# Patient Record
Sex: FEMALE | Race: WHITE | NOT HISPANIC OR LATINO | URBAN - METROPOLITAN AREA
[De-identification: names, ages, dates, MRNs, and addresses within clinical notes are randomized per-mention and may not be internally consistent; named-entity substitution may affect disease eponyms.]

---

## 2024-03-18 ENCOUNTER — OFFICE VISIT (OUTPATIENT)
Dept: HEMATOLOGY/ONCOLOGY | Facility: CLINIC | Age: 74
End: 2024-03-18
Payer: MEDICARE

## 2024-03-18 VITALS
HEIGHT: 62 IN | RESPIRATION RATE: 17 BRPM | SYSTOLIC BLOOD PRESSURE: 145 MMHG | DIASTOLIC BLOOD PRESSURE: 82 MMHG | BODY MASS INDEX: 22.14 KG/M2 | WEIGHT: 120.31 LBS | OXYGEN SATURATION: 96 % | HEART RATE: 113 BPM

## 2024-03-18 DIAGNOSIS — C25.0 MALIGNANT NEOPLASM OF HEAD OF PANCREAS: ICD-10-CM

## 2024-03-18 DIAGNOSIS — C25.9 PANCREATIC ADENOCARCINOMA: Primary | ICD-10-CM

## 2024-03-18 PROCEDURE — 1101F PT FALLS ASSESS-DOCD LE1/YR: CPT | Mod: CPTII,S$GLB,, | Performed by: INTERNAL MEDICINE

## 2024-03-18 PROCEDURE — 3008F BODY MASS INDEX DOCD: CPT | Mod: CPTII,S$GLB,, | Performed by: INTERNAL MEDICINE

## 2024-03-18 PROCEDURE — 1125F AMNT PAIN NOTED PAIN PRSNT: CPT | Mod: CPTII,S$GLB,, | Performed by: INTERNAL MEDICINE

## 2024-03-18 PROCEDURE — 99205 OFFICE O/P NEW HI 60 MIN: CPT | Mod: S$GLB,,, | Performed by: INTERNAL MEDICINE

## 2024-03-18 PROCEDURE — 1159F MED LIST DOCD IN RCRD: CPT | Mod: CPTII,S$GLB,, | Performed by: INTERNAL MEDICINE

## 2024-03-18 PROCEDURE — 3079F DIAST BP 80-89 MM HG: CPT | Mod: CPTII,S$GLB,, | Performed by: INTERNAL MEDICINE

## 2024-03-18 PROCEDURE — 3077F SYST BP >= 140 MM HG: CPT | Mod: CPTII,S$GLB,, | Performed by: INTERNAL MEDICINE

## 2024-03-18 PROCEDURE — 3288F FALL RISK ASSESSMENT DOCD: CPT | Mod: CPTII,S$GLB,, | Performed by: INTERNAL MEDICINE

## 2024-03-18 RX ORDER — OXYCODONE AND ACETAMINOPHEN 10; 325 MG/1; MG/1
1 TABLET ORAL EVERY 4 HOURS PRN
COMMUNITY
End: 2024-04-15

## 2024-03-18 NOTE — PROGRESS NOTES
MEDICAL ONCOLOGY INITIAL CONSULTATION NOTE    Patient ID: Luanne Hernández is a 73 y.o. female.    Chief Complaint: Pancreatic adenocarcinoma    HPI: Patient is a 73 year female with no reported PMH who initially presented to [facility] with epigastric abdominal pain ultimately found to have pancreatic mass. MRI  performed and no metastatic lesions found, GI performed EUS. Has been evaluated by Hepato-biliary surgery at  and is consider borderline resectable with plan for dusty-adjuvant chemotherapy. She presents here to establish care close to her house.     Pathology: 3/1/24    Pancreatic head mass, fine needle aspiration (4 smears, 1 ThinPrep, 1   cell block):                                                            - Positive for malignant cells, consistent with ductal adenocarcinoma.         Imaging:     CT C/A/P : 24    Shows an ill-defined low-density mass in the region of the head and uncinate process of the pancreas suspicious for adenocarcinoma. CA 19-9 is over 42,000. There is also diffuse dilation of the pancreatic duct along with pancreatic pseudocyst formation   No evidence of metastatic disease in the chest.           Past Medical History:   Diagnosis Date    Pancreatic cancer      Family History   Problem Relation Age of Onset    Heart failure Mother     Hodgkin's lymphoma Father     Bladder Cancer Brother      Social History     Socioeconomic History    Marital status: Single   Tobacco Use    Smoking status: Former     Current packs/day: 0.00     Types: Cigarettes     Quit date:      Years since quittin.2    Smokeless tobacco: Never   Substance and Sexual Activity    Alcohol use: Not Currently    Drug use: Never     Past Surgical History:   Procedure Laterality Date    LASIK           Review of systems:  Review of Systems   Constitutional:  Positive for activity change. Negative for appetite change, chills, diaphoresis, fatigue and unexpected weight change.   HENT:  Negative for  congestion, facial swelling, hearing loss, mouth sores, trouble swallowing and voice change.    Eyes:  Negative for photophobia, pain, discharge and itching.   Respiratory:  Negative for apnea, cough, choking, chest tightness and shortness of breath.    Cardiovascular:  Negative for chest pain, palpitations and leg swelling.   Gastrointestinal:  Positive for abdominal pain. Negative for abdominal distention, anal bleeding and blood in stool.   Endocrine: Negative for cold intolerance, heat intolerance, polydipsia and polyphagia.   Genitourinary:  Negative for difficulty urinating, dysuria, flank pain and hematuria.   Musculoskeletal:  Negative for arthralgias, back pain, joint swelling, myalgias, neck pain and neck stiffness.   Skin:  Negative for color change, pallor and wound.   Allergic/Immunologic: Negative for environmental allergies, food allergies and immunocompromised state.   Neurological:  Negative for dizziness, seizures, facial asymmetry, speech difficulty, light-headedness, numbness and headaches.   Hematological:  Negative for adenopathy. Does not bruise/bleed easily.   Psychiatric/Behavioral:  Negative for agitation, behavioral problems, confusion, decreased concentration and sleep disturbance.            Physical Exam  Vitals and nursing note reviewed.   Constitutional:       General: She is not in acute distress.     Appearance: Normal appearance. She is not ill-appearing.   HENT:      Head: Normocephalic and atraumatic.      Nose: No congestion or rhinorrhea.   Eyes:      General: No scleral icterus.     Extraocular Movements: Extraocular movements intact.      Pupils: Pupils are equal, round, and reactive to light.   Cardiovascular:      Rate and Rhythm: Normal rate and regular rhythm.      Pulses: Normal pulses.      Heart sounds: Normal heart sounds. No murmur heard.     No gallop.   Pulmonary:      Effort: Pulmonary effort is normal. No respiratory distress.      Breath sounds: Normal breath  sounds. No stridor. No wheezing or rhonchi.   Abdominal:      General: Bowel sounds are normal. There is no distension.      Palpations: There is no mass.      Tenderness: There is abdominal tenderness. There is no guarding.   Musculoskeletal:         General: No swelling, tenderness, deformity or signs of injury. Normal range of motion.      Cervical back: Normal range of motion and neck supple. No rigidity. No muscular tenderness.      Right lower leg: No edema.      Left lower leg: No edema.   Skin:     General: Skin is warm.      Coloration: Skin is not jaundiced or pale.      Findings: No bruising or lesion.   Neurological:      General: No focal deficit present.      Mental Status: She is alert and oriented to person, place, and time.      Cranial Nerves: No cranial nerve deficit.      Sensory: No sensory deficit.      Motor: No weakness.      Gait: Gait normal.   Psychiatric:         Mood and Affect: Mood normal.         Behavior: Behavior normal.         Thought Content: Thought content normal.       Vitals:    03/18/24 0857   BP: (!) 145/82   Pulse: (!) 113   Resp: 17   Body surface area is 1.55 meters squared.    Assessment/Plan:      ECOG 1    Adenocarcinoma arising from pancreatic head and uncinate process:    == Borderline resectable.  No evidence of arterial involvement on CT scan.  No evidence of distant metastatic disease on CT imaging.   Has been evaluated by hepatobiliary surgery in [place] with plan for neoadjuvant chemotherapy.   == Discussed with her and family management guidelines and my recommendation for staging laparoscopy. Baseline  at 42 K at outside hospital  == I discussed with her the intended side effects of treatment giving her opportunity to ask questions.  After detailed discussion of intended side effects we agreed to start gemcitabine Abraxane secondary to her age and performance status.  Prior authorization request for gemcitabine plus Abraxane plus minus subsequent  chemoradiation sent.        2. Pain Management:    == Continue with oxycodone prn      Plan:  Prior authorization for gem Abraxane. Due to her age and convenience will do q 2 weekly  Will also send Referral to Radiation Oncology after few cycles of gem Abraxane  Port placement and diagnostic laparoscopy recommended.  These will be done in [place]          Total time spent in counseling and discussion about further management options including relevant lab work, treatment,  prognosis, medications and intended side effects was more than 60 minutes. More than 50% of the time was spent on counseling and coordination of care.  This includes face to face time and non-face to face time preparing to see the patient (eg, review of tests), Obtaining and/or reviewing separately obtained history, Documenting clinical information in the electronic or other health record, Independently interpreting resultsand communicating results to the patient/family/caregiver, or Care coordination.

## 2024-03-20 ENCOUNTER — TELEPHONE (OUTPATIENT)
Dept: HEMATOLOGY/ONCOLOGY | Facility: CLINIC | Age: 74
End: 2024-03-20
Payer: MEDICARE

## 2024-03-20 NOTE — TELEPHONE ENCOUNTER
LVM for pt to rtc         ----- Message from Lynn Blake sent at 3/20/2024  4:42 PM CDT -----  Contact: self  Type:  Patient Returning Call    Who Called:Luanne Hernández  Who Left Message for Patient:unsure  Does the patient know what this is regarding?:treatment  Would the patient rather a call back or a response via [facility]?   Best Call Back Number:[phone]    Additional Information: [phone] DR. Geovany Diane Oncologist  Laproscopy procedure

## 2024-03-22 ENCOUNTER — TELEPHONE (OUTPATIENT)
Dept: HEMATOLOGY/ONCOLOGY | Facility: CLINIC | Age: 74
End: 2024-03-22

## 2024-03-22 DIAGNOSIS — C25.9 PANCREATIC ADENOCARCINOMA: Primary | ICD-10-CM

## 2024-03-22 LAB
ALBUMIN SERPL BCP-MCNC: 3.3 G/DL (ref 3.4–5)
ALBUMIN/GLOBULIN RATIO: 0.97 RATIO (ref 1.1–1.8)
ALP SERPL-CCNC: 608 U/L (ref 46–116)
ALT SERPL W P-5'-P-CCNC: 464 U/L (ref 12–78)
ANION GAP SERPL CALC-SCNC: 13 MMOL/L (ref 3–11)
AST SERPL-CCNC: 409 U/L (ref 15–37)
BASOPHILS NFR BLD: 0.8 % (ref 0–3)
BILIRUB SERPL-MCNC: 7.5 MG/DL (ref 0–1)
BUN SERPL-MCNC: 9 MG/DL (ref 7–18)
BUN/CREAT SERPL: 11.68 RATIO (ref 7–18)
CALCIUM SERPL-MCNC: 9.8 MG/DL (ref 8.8–10.5)
CHLORIDE SERPL-SCNC: 98 MMOL/L (ref 100–108)
CO2 SERPL-SCNC: 26 MMOL/L (ref 21–32)
CREAT SERPL-MCNC: 0.77 MG/DL (ref 0.55–1.02)
EOSINOPHIL NFR BLD: 2.2 % (ref 1–3)
ERYTHROCYTE [DISTWIDTH] IN BLOOD BY AUTOMATED COUNT: 13.4 % (ref 12.5–18)
GFR ESTIMATION: > 60
GLOBULIN: 3.4 G/DL (ref 2.3–3.5)
GLUCOSE SERPL-MCNC: 70 MG/DL (ref 70–110)
HCT VFR BLD AUTO: 39.6 % (ref 37–47)
HGB BLD-MCNC: 12.5 G/DL (ref 12–16)
LYMPHOCYTES NFR BLD: 15.3 % (ref 25–40)
MCH RBC QN AUTO: 30.8 PG (ref 27–31.2)
MCHC RBC AUTO-ENTMCNC: 31.6 G/DL (ref 31.8–35.4)
MCV RBC AUTO: 97.5 FL (ref 80–97)
MONOCYTES NFR BLD: 8.9 % (ref 1–15)
NEUTROPHILS # BLD AUTO: 5.59 10*3/UL (ref 1.8–7.7)
NEUTROPHILS NFR BLD: 72.4 % (ref 37–80)
NUCLEATED RED BLOOD CELLS: 0 %
PLATELETS: 280 10*3/UL (ref 142–424)
POTASSIUM SERPL-SCNC: 3.9 MMOL/L (ref 3.6–5.2)
PROT SERPL-MCNC: 6.7 G/DL (ref 6.4–8.2)
RBC # BLD AUTO: 4.06 10*6/UL (ref 4.2–5.4)
SODIUM BLD-SCNC: 137 MMOL/L (ref 135–145)
WBC # BLD: 7.7 10*3/UL (ref 4.6–10.2)

## 2024-03-22 NOTE — TELEPHONE ENCOUNTER
Called the patient to discuss date for port in [place]. Patient states she does not have an appointment yet that Dr Jacob Diane Oncologist  would like to discuss care with Dr Mazariegos. Secure Message sent to provider with information. GABRIELLA

## 2024-03-24 LAB — CANCER AG19-9 SERPL-ACNC: 9869 U/ML

## 2024-03-25 ENCOUNTER — CLINICAL SUPPORT (OUTPATIENT)
Dept: HEMATOLOGY/ONCOLOGY | Facility: CLINIC | Age: 74
End: 2024-03-25
Payer: MEDICARE

## 2024-03-25 VITALS
HEIGHT: 62 IN | BODY MASS INDEX: 22.12 KG/M2 | OXYGEN SATURATION: 95 % | SYSTOLIC BLOOD PRESSURE: 130 MMHG | DIASTOLIC BLOOD PRESSURE: 81 MMHG | HEART RATE: 84 BPM | RESPIRATION RATE: 18 BRPM | WEIGHT: 120.19 LBS

## 2024-03-25 DIAGNOSIS — G89.3 CANCER RELATED PAIN: ICD-10-CM

## 2024-03-25 DIAGNOSIS — R17 ELEVATED BILIRUBIN: ICD-10-CM

## 2024-03-25 DIAGNOSIS — C25.9 PANCREATIC ADENOCARCINOMA: Primary | ICD-10-CM

## 2024-03-25 PROCEDURE — 99417 PROLNG OP E/M EACH 15 MIN: CPT | Mod: S$GLB,,, | Performed by: NURSE PRACTITIONER

## 2024-03-25 PROCEDURE — 99215 OFFICE O/P EST HI 40 MIN: CPT | Mod: S$GLB,,, | Performed by: NURSE PRACTITIONER

## 2024-03-25 PROCEDURE — G2211 COMPLEX E/M VISIT ADD ON: HCPCS | Mod: S$GLB,,, | Performed by: NURSE PRACTITIONER

## 2024-03-25 RX ORDER — OXYCODONE HYDROCHLORIDE 10 MG/1
10 TABLET ORAL EVERY 8 HOURS PRN
Qty: 60 TABLET | Refills: 0 | Status: SHIPPED | OUTPATIENT
Start: 2024-03-25 | End: 2024-04-01

## 2024-03-25 NOTE — PROGRESS NOTES
MEDICAL ONCOLOGY INITIAL CONSULTATION NOTE    Patient ID: Luanne Hernández is a 73 y.o. female.    Chief Complaint: Pancreatic adenocarcinoma    HPI: Patient is a 73 year female with no reported PMH who initially presented to [facility] with epigastric abdominal pain ultimately found to have pancreatic mass. MRI  performed and no metastatic lesions found, GI performed EUS. Has been evaluated by Hepato-biliary surgery at  and is consider borderline resectable with plan for dusty-adjuvant chemotherapy. She presents here to establish care close to her house.     Pathology: 3/1/24    Pancreatic head mass, fine needle aspiration (4 smears, 1 ThinPrep, 1   cell block):                                                            - Positive for malignant cells, consistent with ductal adenocarcinoma.         Imaging:     CT C/A/P : 24    Shows an ill-defined low-density mass in the region of the head and uncinate process of the pancreas suspicious for adenocarcinoma. CA 19-9 is over 42,000. There is also diffuse dilation of the pancreatic duct along with pancreatic pseudocyst formation   No evidence of metastatic disease in the chest.           Past Medical History:   Diagnosis Date    Pancreatic cancer      Family History   Problem Relation Age of Onset    Heart failure Mother     Hodgkin's lymphoma Father     Bladder Cancer Brother      Social History     Socioeconomic History    Marital status: Single   Tobacco Use    Smoking status: Former     Current packs/day: 0.00     Types: Cigarettes     Quit date:      Years since quittin.2    Smokeless tobacco: Never   Substance and Sexual Activity    Alcohol use: Not Currently    Drug use: Never     Past Surgical History:   Procedure Laterality Date    LASIK           Review of systems:  Review of Systems   Constitutional:  Positive for activity change. Negative for appetite change, chills, diaphoresis, fatigue and unexpected weight change.   HENT:  Negative for  congestion, facial swelling, hearing loss, mouth sores, trouble swallowing and voice change.    Eyes:  Negative for photophobia, pain, discharge and itching.   Respiratory:  Negative for apnea, cough, choking, chest tightness and shortness of breath.    Cardiovascular:  Negative for chest pain, palpitations and leg swelling.   Gastrointestinal:  Positive for abdominal pain. Negative for abdominal distention, anal bleeding and blood in stool.   Endocrine: Negative for cold intolerance, heat intolerance, polydipsia and polyphagia.   Genitourinary:  Negative for difficulty urinating, dysuria, flank pain and hematuria.   Musculoskeletal:  Negative for arthralgias, back pain, joint swelling, myalgias, neck pain and neck stiffness.   Skin:  Negative for color change, pallor and wound.   Allergic/Immunologic: Negative for environmental allergies, food allergies and immunocompromised state.   Neurological:  Negative for dizziness, seizures, facial asymmetry, speech difficulty, light-headedness, numbness and headaches.   Hematological:  Negative for adenopathy. Does not bruise/bleed easily.   Psychiatric/Behavioral:  Negative for agitation, behavioral problems, confusion, decreased concentration and sleep disturbance.            Physical Exam  Vitals and nursing note reviewed.   Constitutional:       General: She is not in acute distress.     Appearance: Normal appearance. She is not ill-appearing.   HENT:      Head: Normocephalic and atraumatic.      Nose: No congestion or rhinorrhea.   Eyes:      General: No scleral icterus.     Extraocular Movements: Extraocular movements intact.      Pupils: Pupils are equal, round, and reactive to light.   Cardiovascular:      Rate and Rhythm: Normal rate and regular rhythm.      Pulses: Normal pulses.      Heart sounds: Normal heart sounds. No murmur heard.     No gallop.   Pulmonary:      Effort: Pulmonary effort is normal. No respiratory distress.      Breath sounds: Normal breath  sounds. No stridor. No wheezing or rhonchi.   Abdominal:      General: Bowel sounds are normal. There is no distension.      Palpations: There is no mass.      Tenderness: There is abdominal tenderness. There is no guarding.   Musculoskeletal:         General: No swelling, tenderness, deformity or signs of injury. Normal range of motion.      Cervical back: Normal range of motion and neck supple. No rigidity. No muscular tenderness.      Right lower leg: No edema.      Left lower leg: No edema.   Skin:     General: Skin is warm.      Coloration: Skin is not jaundiced or pale.      Findings: No bruising or lesion.   Neurological:      General: No focal deficit present.      Mental Status: She is alert and oriented to person, place, and time.      Cranial Nerves: No cranial nerve deficit.      Sensory: No sensory deficit.      Motor: No weakness.      Gait: Gait normal.   Psychiatric:         Mood and Affect: Mood normal.         Behavior: Behavior normal.         Thought Content: Thought content normal.     There were no vitals filed for this visit.  There is no height or weight on file to calculate BSA.    Assessment/Plan:      ECOG 1    Adenocarcinoma arising from pancreatic head and uncinate process:    == Borderline resectable.  No evidence of arterial involvement on CT scan.  No evidence of distant metastatic disease on CT imaging.   Has been evaluated by hepatobiliary surgery in [place] with plan for neoadjuvant chemotherapy.   == Discussed with her and family management guidelines and my recommendation for staging laparoscopy. Baseline  at 42 K at outside hospital  == I discussed with her the intended side effects of treatment giving her opportunity to ask questions.  After detailed discussion of intended side effects we agreed to start gemcitabine Abraxane secondary to her age and performance status.  Prior authorization request for gemcitabine plus Abraxane plus minus subsequent chemoradiation  sent.    ==03/25/2024: Patient to clinic for chemotherapy education on Gemcitabine/Abraxane. She has some right flank pain and jaundice. Family with her reports donell started yesterday.  Labs drawn 3.22/24 reviewed and bili 7.5, labs drawn 3 weeks prior bili was 0.4. Chemotherapy education not provided, pt needs evaluation may need stent placement.  Local ER not able to do MRCP/bili stents, called Dr. Gilmore's office in [place] and spoke to his nurse who reported he will not be back until 3/28/2024 with no coverage until then, called [facility] where pt was initially diagnosed, no answer, left messages.  discussed with patient and Dr. Mazariegos, will send referral to [facility] and patient will go to ER.  Pt will still need port placement for chemotherapy, lap for staging, at this time most acute matter is liver function.    2. Pain Management:  == Continue with oxycodone prn  ==Refilled 3/25/2024  ==Avoid acetaminophen     Plan:  To ER - Bilirubin 7.5,   Refer to [facility]  Will follow up with patient after hospital and rtc for chemotherapy education  Port placement and diagnostic laparoscopy recommended.           Total time spent in counseling and discussion about further management options including relevant lab work, treatment,  prognosis, medications and intended side effects was more than 60 minutes. More than 50% of the time was spent on counseling and coordination of care.  This includes face to face time and non-face to face time preparing to see the patient (eg, review of tests), Obtaining and/or reviewing separately obtained history, Documenting clinical information in the electronic or other health record, Independently interpreting resultsand communicating results to the patient/family/caregiver, or Care coordination.

## 2024-03-27 ENCOUNTER — TELEPHONE (OUTPATIENT)
Dept: HEMATOLOGY/ONCOLOGY | Facility: CLINIC | Age: 74
End: 2024-03-27
Payer: MEDICARE

## 2024-03-27 DIAGNOSIS — C25.9 PANCREATIC ADENOCARCINOMA: Primary | ICD-10-CM

## 2024-03-27 NOTE — TELEPHONE ENCOUNTER
Spoke to patient, she has been d/c'd from [facility] post bilis stent placement. She needs \[facility] [place] [facility]\"" to start chemotherapy.  Will send referral for port placement.

## 2024-03-28 ENCOUNTER — TELEPHONE (OUTPATIENT)
Dept: HEMATOLOGY/ONCOLOGY | Facility: CLINIC | Age: 74
End: 2024-03-28
Payer: MEDICARE

## 2024-03-28 NOTE — TELEPHONE ENCOUNTER
----- Message from Boubacar Leo sent at 3/28/2024  8:42 AM CDT -----  Contact: self  Patient Luanne Hernández is requesting a call back regarding MediPort placement and confirmation. Previous call was dropped. Please call back at [phone]

## 2024-03-28 NOTE — TELEPHONE ENCOUNTER
Spoke with the patient Port IV appointment given. New chemo ed appt given. All questions answered. Spoke with Karen PURCELL regarding stent placements. She was aware. Patient states she will need to be back in [place] on 4/16/24 to remove pancreatic stent. Karen made aware. JESEN

## 2024-04-01 ENCOUNTER — OFFICE VISIT (OUTPATIENT)
Dept: SURGERY | Facility: CLINIC | Age: 74
End: 2024-04-01
Payer: MEDICARE

## 2024-04-01 ENCOUNTER — OFFICE VISIT (OUTPATIENT)
Dept: HEMATOLOGY/ONCOLOGY | Facility: CLINIC | Age: 74
End: 2024-04-01
Payer: MEDICARE

## 2024-04-01 VITALS
DIASTOLIC BLOOD PRESSURE: 74 MMHG | RESPIRATION RATE: 16 BRPM | HEIGHT: 62 IN | HEART RATE: 103 BPM | BODY MASS INDEX: 21.97 KG/M2 | OXYGEN SATURATION: 95 % | WEIGHT: 119.38 LBS | SYSTOLIC BLOOD PRESSURE: 108 MMHG

## 2024-04-01 DIAGNOSIS — G89.3 CANCER RELATED PAIN: ICD-10-CM

## 2024-04-01 DIAGNOSIS — C25.9 PANCREATIC ADENOCARCINOMA: Primary | ICD-10-CM

## 2024-04-01 DIAGNOSIS — N28.89 RENAL MASS: ICD-10-CM

## 2024-04-01 LAB
ALBUMIN SERPL BCP-MCNC: 2.9 G/DL (ref 3.4–5)
ALBUMIN/GLOBULIN RATIO: 0.83 RATIO (ref 1.1–1.8)
ALP SERPL-CCNC: 311 U/L (ref 46–116)
ALT SERPL W P-5'-P-CCNC: 123 U/L (ref 12–78)
ANION GAP SERPL CALC-SCNC: 10 MMOL/L (ref 3–11)
AST SERPL-CCNC: 36 U/L (ref 15–37)
BASOPHILS NFR BLD: 0.5 % (ref 0–3)
BILIRUB SERPL-MCNC: 2.2 MG/DL (ref 0–1)
BILIRUBIN DIRECT+TOT PNL SERPL-MCNC: 1.7 MG/DL (ref 0–0.3)
BUN SERPL-MCNC: 12 MG/DL (ref 7–18)
BUN/CREAT SERPL: 12.12 RATIO (ref 7–18)
CALCIUM SERPL-MCNC: 9 MG/DL (ref 8.8–10.5)
CHLORIDE SERPL-SCNC: 99 MMOL/L (ref 100–108)
CO2 SERPL-SCNC: 28 MMOL/L (ref 21–32)
CREAT SERPL-MCNC: 0.99 MG/DL (ref 0.55–1.02)
EOSINOPHIL NFR BLD: 1.4 % (ref 1–3)
ERYTHROCYTE [DISTWIDTH] IN BLOOD BY AUTOMATED COUNT: 14.2 % (ref 12.5–18)
GFR ESTIMATION: 55
GLOBULIN: 3.5 G/DL (ref 2.3–3.5)
GLUCOSE SERPL-MCNC: 97 MG/DL (ref 70–110)
HCT VFR BLD AUTO: 34.3 % (ref 37–47)
HGB BLD-MCNC: 10.7 G/DL (ref 12–16)
LYMPHOCYTES NFR BLD: 19.6 % (ref 25–40)
MCH RBC QN AUTO: 30.7 PG (ref 27–31.2)
MCHC RBC AUTO-ENTMCNC: 31.2 G/DL (ref 31.8–35.4)
MCV RBC AUTO: 98.6 FL (ref 80–97)
MONOCYTES NFR BLD: 13.9 % (ref 1–15)
NEUTROPHILS # BLD AUTO: 5.23 10*3/UL (ref 1.8–7.7)
NEUTROPHILS NFR BLD: 64.2 % (ref 37–80)
NUCLEATED RED BLOOD CELLS: 0 %
PLATELETS: 293 10*3/UL (ref 142–424)
POTASSIUM SERPL-SCNC: 4.1 MMOL/L (ref 3.6–5.2)
PROT SERPL-MCNC: 6.4 G/DL (ref 6.4–8.2)
RBC # BLD AUTO: 3.48 10*6/UL (ref 4.2–5.4)
SODIUM BLD-SCNC: 137 MMOL/L (ref 135–145)
WBC # BLD: 8.1 10*3/UL (ref 4.6–10.2)

## 2024-04-01 PROCEDURE — 1126F AMNT PAIN NOTED NONE PRSNT: CPT | Mod: CPTII,S$GLB,, | Performed by: NURSE PRACTITIONER

## 2024-04-01 PROCEDURE — 1101F PT FALLS ASSESS-DOCD LE1/YR: CPT | Mod: CPTII,S$GLB,, | Performed by: NURSE PRACTITIONER

## 2024-04-01 PROCEDURE — 1159F MED LIST DOCD IN RCRD: CPT | Mod: CPTII,S$GLB,, | Performed by: NURSE PRACTITIONER

## 2024-04-01 PROCEDURE — 99215 OFFICE O/P EST HI 40 MIN: CPT | Mod: S$GLB,,, | Performed by: NURSE PRACTITIONER

## 2024-04-01 PROCEDURE — 3074F SYST BP LT 130 MM HG: CPT | Mod: CPTII,S$GLB,, | Performed by: NURSE PRACTITIONER

## 2024-04-01 PROCEDURE — 3078F DIAST BP <80 MM HG: CPT | Mod: CPTII,S$GLB,, | Performed by: NURSE PRACTITIONER

## 2024-04-01 PROCEDURE — 3008F BODY MASS INDEX DOCD: CPT | Mod: CPTII,S$GLB,, | Performed by: NURSE PRACTITIONER

## 2024-04-01 PROCEDURE — G2211 COMPLEX E/M VISIT ADD ON: HCPCS | Mod: S$GLB,,, | Performed by: NURSE PRACTITIONER

## 2024-04-01 PROCEDURE — 1159F MED LIST DOCD IN RCRD: CPT | Mod: CPTII,S$GLB,, | Performed by: SURGERY

## 2024-04-01 PROCEDURE — 99203 OFFICE O/P NEW LOW 30 MIN: CPT | Mod: S$GLB,,, | Performed by: SURGERY

## 2024-04-01 PROCEDURE — 1160F RVW MEDS BY RX/DR IN RCRD: CPT | Mod: CPTII,S$GLB,, | Performed by: SURGERY

## 2024-04-01 PROCEDURE — 3288F FALL RISK ASSESSMENT DOCD: CPT | Mod: CPTII,S$GLB,, | Performed by: NURSE PRACTITIONER

## 2024-04-01 RX ORDER — AMOXICILLIN 250 MG
1 CAPSULE ORAL 2 TIMES DAILY
COMMUNITY
Start: 2024-03-27 | End: 2024-06-25

## 2024-04-01 RX ORDER — ONDANSETRON HYDROCHLORIDE 8 MG/1
8 TABLET, FILM COATED ORAL EVERY 12 HOURS PRN
Qty: 30 TABLET | Refills: 2 | Status: SHIPPED | OUTPATIENT
Start: 2024-04-01 | End: 2025-04-01

## 2024-04-01 RX ORDER — PANTOPRAZOLE SODIUM 40 MG/1
40 TABLET, DELAYED RELEASE ORAL DAILY
COMMUNITY
Start: 2024-03-27 | End: 2024-05-16 | Stop reason: SDUPTHER

## 2024-04-01 RX ORDER — POLYETHYLENE GLYCOL 3350 17 G/17G
17 POWDER, FOR SOLUTION ORAL DAILY
COMMUNITY
Start: 2024-03-28 | End: 2024-06-26

## 2024-04-01 RX ORDER — PANTOPRAZOLE SODIUM 20 MG/1
20 TABLET, DELAYED RELEASE ORAL DAILY
COMMUNITY
End: 2024-04-01

## 2024-04-01 RX ORDER — OXYCODONE HYDROCHLORIDE 10 MG/1
10 TABLET ORAL EVERY 8 HOURS PRN
COMMUNITY
End: 2024-04-01

## 2024-04-01 NOTE — PROGRESS NOTES
History & Physical    SUBJECTIVE:     History of Present Illness:    73-year-old female recently diagnosed with pancreatic cancer and is referred to me for venous access port placement to start chemotherapy on Wednesday.  Recently status post both pancreatic and biliary stent placement at [facility]    Chief Complaint   Patient presents with    Other     port         Review of patient's allergies indicates:  Review of patient's allergies indicates:  Not on File    Current Outpatient Medications on File Prior to Visit   Medication Sig Dispense Refill    oxyCODONE-acetaminophen (PERCOCET)  mg per tablet Take 1 tablet by mouth every 4 (four) hours as needed for Pain.      [DISCONTINUED] oxyCODONE (ROXICODONE) 10 mg Tab immediate release tablet Take 1 tablet (10 mg total) by mouth every 8 (eight) hours as needed for Pain. 60 tablet 0     No current facility-administered medications on file prior to visit.       Past Medical History:   Diagnosis Date    Pancreatic cancer      Past Surgical History:   Procedure Laterality Date    LASIK Bilateral      Family History   Problem Relation Age of Onset    Heart failure Mother     Hodgkin's lymphoma Father     Bladder Cancer Brother        Social History     Socioeconomic History    Marital status: Single   Tobacco Use    Smoking status: Former     Current packs/day: 0.00     Types: Cigarettes     Quit date:      Years since quittin.2    Smokeless tobacco: Former   Substance and Sexual Activity    Alcohol use: Not Currently    Drug use: Never          Review of Systems   Constitutional:  Positive for malaise/fatigue and weight loss.   Respiratory: Negative.     Cardiovascular: Negative.    Gastrointestinal:  Positive for abdominal pain.   Musculoskeletal:  Positive for back pain.   Neurological: Negative.    Endo/Heme/Allergies: Negative.    Psychiatric/Behavioral: Negative.         OBJECTIVE:     There were no vitals filed for this visit.              Physical  Exam:  Physical Exam  Constitutional:       Appearance: Normal appearance.   HENT:      Head: Normocephalic.   Eyes:      Pupils: Pupils are equal, round, and reactive to light.   Cardiovascular:      Rate and Rhythm: Normal rate and regular rhythm.   Pulmonary:      Effort: Pulmonary effort is normal.      Breath sounds: Normal breath sounds.   Abdominal:      General: Abdomen is flat. Bowel sounds are normal.      Palpations: Abdomen is soft.   Skin:     General: Skin is warm and dry.   Neurological:      General: No focal deficit present.      Mental Status: She is alert and oriented to person, place, and time.      Cranial Nerves: No cranial nerve deficit.   Psychiatric:         Mood and Affect: Mood normal.         Behavior: Behavior normal.             ASSESSMENT/PLAN:   Had a pancreas adenocarcinoma, plans to start chemotherapy and needs venous access port  PLAN:  Discussed with and schedule patient for tomorrow a left subclavian venous access PowerPort placement.  Procedure, risk and benefits discussed and all questions answered

## 2024-04-01 NOTE — PROGRESS NOTES
MEDICAL ONCOLOGY INITIAL CONSULTATION NOTE    Patient ID: Luanne Hernández is a 73 y.o. female.    Chief Complaint: Pancreatic adenocarcinoma    HPI: Patient is a 73 year female with no reported PMH who initially presented to [facility] with epigastric abdominal pain ultimately found to have pancreatic mass. MRI  performed and no metastatic lesions found, GI performed EUS. Has been evaluated by Hepato-biliary surgery at  and is consider borderline resectable with plan for dusty-adjuvant chemotherapy. She presents here to establish care close to her house.     Pathology: 3/1/24    Pancreatic head mass, fine needle aspiration (4 smears, 1 ThinPrep, 1   cell block):                                                            - Positive for malignant cells, consistent with ductal adenocarcinoma.         Imaging:     CT C/A/P : 24    Shows an ill-defined low-density mass in the region of the head and uncinate process of the pancreas suspicious for adenocarcinoma. CA 19-9 is over 42,000. There is also diffuse dilation of the pancreatic duct along with pancreatic pseudocyst formation   No evidence of metastatic disease in the chest.           Past Medical History:   Diagnosis Date    Pancreatic cancer      Family History   Problem Relation Age of Onset    Heart failure Mother     Hodgkin's lymphoma Father     Bladder Cancer Brother      Social History     Socioeconomic History    Marital status: Single   Tobacco Use    Smoking status: Former     Current packs/day: 0.00     Types: Cigarettes     Quit date:      Years since quittin.2    Smokeless tobacco: Never   Substance and Sexual Activity    Alcohol use: Not Currently    Drug use: Never     Past Surgical History:   Procedure Laterality Date    LASIK           Review of systems:  Review of Systems   Constitutional:  Positive for activity change. Negative for appetite change, chills, diaphoresis, fatigue and unexpected weight change.   HENT:  Negative for  congestion, facial swelling, hearing loss, mouth sores, trouble swallowing and voice change.    Eyes:  Negative for photophobia, pain, discharge and itching.   Respiratory:  Negative for apnea, cough, choking, chest tightness and shortness of breath.    Cardiovascular:  Negative for chest pain, palpitations and leg swelling.   Gastrointestinal:  Positive for abdominal pain. Negative for abdominal distention, anal bleeding and blood in stool.   Endocrine: Negative for cold intolerance, heat intolerance, polydipsia and polyphagia.   Genitourinary:  Negative for difficulty urinating, dysuria, flank pain and hematuria.   Musculoskeletal:  Negative for arthralgias, back pain, joint swelling, myalgias, neck pain and neck stiffness.   Skin:  Negative for color change, pallor and wound.   Allergic/Immunologic: Negative for environmental allergies, food allergies and immunocompromised state.   Neurological:  Negative for dizziness, seizures, facial asymmetry, speech difficulty, light-headedness, numbness and headaches.   Hematological:  Negative for adenopathy. Does not bruise/bleed easily.   Psychiatric/Behavioral:  Negative for agitation, behavioral problems, confusion, decreased concentration and sleep disturbance.            Physical Exam  Vitals and nursing note reviewed.   Constitutional:       General: She is not in acute distress.     Appearance: Normal appearance. She is not ill-appearing.   HENT:      Head: Normocephalic and atraumatic.      Nose: No congestion or rhinorrhea.   Eyes:      General: No scleral icterus.     Extraocular Movements: Extraocular movements intact.      Pupils: Pupils are equal, round, and reactive to light.   Cardiovascular:      Rate and Rhythm: Normal rate and regular rhythm.      Pulses: Normal pulses.      Heart sounds: Normal heart sounds. No murmur heard.     No gallop.   Pulmonary:      Effort: Pulmonary effort is normal. No respiratory distress.      Breath sounds: Normal breath  sounds. No stridor. No wheezing or rhonchi.   Abdominal:      General: Bowel sounds are normal. There is no distension.      Palpations: There is no mass.      Tenderness: There is abdominal tenderness. There is no guarding.   Musculoskeletal:         General: No swelling, tenderness, deformity or signs of injury. Normal range of motion.      Cervical back: Normal range of motion and neck supple. No rigidity. No muscular tenderness.      Right lower leg: No edema.      Left lower leg: No edema.   Skin:     General: Skin is warm.      Coloration: Skin is not jaundiced or pale.      Findings: No bruising or lesion.   Neurological:      General: No focal deficit present.      Mental Status: She is alert and oriented to person, place, and time.      Cranial Nerves: No cranial nerve deficit.      Sensory: No sensory deficit.      Motor: No weakness.      Gait: Gait normal.   Psychiatric:         Mood and Affect: Mood normal.         Behavior: Behavior normal.         Thought Content: Thought content normal.     There were no vitals filed for this visit.  There is no height or weight on file to calculate BSA.    Assessment/Plan:      ECOG 1    Adenocarcinoma arising from pancreatic head and uncinate process:    == Borderline resectable.  No evidence of arterial involvement on CT scan.  No evidence of distant metastatic disease on CT imaging.   Has been evaluated by hepatobiliary surgery in [place] with plan for neoadjuvant chemotherapy.   == Discussed with her and family management guidelines and my recommendation for staging laparoscopy. Baseline  at 42 K at outside hospital  == I discussed with her the intended side effects of treatment giving her opportunity to ask questions.  After detailed discussion of intended side effects we agreed to start gemcitabine Abraxane secondary to her age and performance status.  Prior authorization request for gemcitabine plus Abraxane plus minus subsequent chemoradiation  sent.    ==03/25/2024: Patient to clinic for chemotherapy education on Gemcitabine/Abraxane. She has some right flank pain and jaundice. Family with her reports juandice started yesterday.  Labs drawn 3.22/24 reviewed and bili 7.5, labs drawn 3 weeks prior bili was 0.4. Chemotherapy education not provided, pt needs evaluation may need stent placement.  Local ER not able to do MRCP/bili stents, called Dr. Gilmore's office in [place] and spoke to his nurse who reported he will not be back until 3/28/2024 with no coverage until then, called [facility] pt was initially diagnosed, no answer, left messages.  discussed with patient and Dr. Mazariegos, will send referral to [facility] and patient will go to ER.  Pt will still need port placement for chemotherapy, lap for staging, at this time most acute matter is liver function.  ==04/01/2024:  Pt here today to discuss upcoming chemotherapy, side effect profile, risk versus benefits, and expected outcomes have been discussed today. All questions and concerns answered and consents have been signed. Pt taking oxycodone 10mg po q 4 hours per [facility].  Plan to start Gemcitabine/Abraxane as soon as port placed, pt has appt with Dr. Mcqueen today for eval for port placement.  Pt was seen at [facility] on 3/25/2024 and biliary stents placed, she has appt on 4/16/24 at [facility] for removal of plastic stents, metal to remain in place. Discussed with Dr. Mazariegos, plan to start chemo asap once port placed, will defer diagnostic lap initially recommended for staging    2. Pain Management:  == Continue with oxycodone prn  ==Refilled 3/25/2024  ==Avoid acetaminophen     3. Renal Mass  ==Per ct at [facility]  ==Possible renal calculi per CT at [place] vs possible second primary, can work up once patient's pancreatic cancer is stable, current priority is to start chemotherapy as she was recently admitted for obstructive juandice secondary to her pancreatic cancer    4. Advanced Care Planning  ==Pt  has power of , will bring in at next visit    Plan:  Chemotherapy asap once port placed  Cbc cmp prior          Total time spent in counseling and discussion about further management options including relevant lab work, treatment,  prognosis, medications and intended side effects was more than 60 minutes. More than 50% of the time was spent on counseling and coordination of care.  This includes face to face time and non-face to face time preparing to see the patient (eg, review of tests), Obtaining and/or reviewing separately obtained history, Documenting clinical information in the electronic or other health record, Independently interpreting resultsand communicating results to the patient/family/caregiver, or Care coordination.

## 2024-04-02 ENCOUNTER — OUTSIDE PLACE OF SERVICE (OUTPATIENT)
Dept: SURGERY | Facility: CLINIC | Age: 74
End: 2024-04-02
Payer: MEDICARE

## 2024-04-02 ENCOUNTER — TELEPHONE (OUTPATIENT)
Dept: HEMATOLOGY/ONCOLOGY | Facility: CLINIC | Age: 74
End: 2024-04-02
Payer: MEDICARE

## 2024-04-02 DIAGNOSIS — G89.3 CANCER RELATED PAIN: Primary | ICD-10-CM

## 2024-04-02 DIAGNOSIS — C25.0 MALIGNANT NEOPLASM OF HEAD OF PANCREAS: Primary | ICD-10-CM

## 2024-04-02 PROCEDURE — 77001 FLUOROGUIDE FOR VEIN DEVICE: CPT | Mod: 26,,, | Performed by: SURGERY

## 2024-04-02 PROCEDURE — 36561 INSERT TUNNELED CV CATH: CPT | Mod: LT,,, | Performed by: SURGERY

## 2024-04-02 RX ORDER — ONDANSETRON HYDROCHLORIDE 2 MG/ML
8 INJECTION, SOLUTION INTRAVENOUS
Status: CANCELLED | OUTPATIENT
Start: 2024-04-04

## 2024-04-02 RX ORDER — SODIUM CHLORIDE 0.9 % (FLUSH) 0.9 %
10 SYRINGE (ML) INJECTION
Status: CANCELLED | OUTPATIENT
Start: 2024-04-04

## 2024-04-02 RX ORDER — HEPARIN 100 UNIT/ML
500 SYRINGE INTRAVENOUS
Status: CANCELLED | OUTPATIENT
Start: 2024-04-04

## 2024-04-04 RX ORDER — SODIUM CHLORIDE 0.9 % (FLUSH) 0.9 %
10 SYRINGE (ML) INJECTION
Status: CANCELLED | OUTPATIENT
Start: 2024-04-18

## 2024-04-04 RX ORDER — ONDANSETRON HYDROCHLORIDE 2 MG/ML
8 INJECTION, SOLUTION INTRAVENOUS
Status: CANCELLED | OUTPATIENT
Start: 2024-04-18

## 2024-04-04 RX ORDER — HEPARIN 100 UNIT/ML
500 SYRINGE INTRAVENOUS
Status: CANCELLED | OUTPATIENT
Start: 2024-04-18

## 2024-04-09 ENCOUNTER — TELEPHONE (OUTPATIENT)
Dept: HEMATOLOGY/ONCOLOGY | Facility: CLINIC | Age: 74
End: 2024-04-09
Payer: MEDICARE

## 2024-04-09 NOTE — TELEPHONE ENCOUNTER
Patient complained of pain and hives after her chemo.  Said following day was rough day and stayed in bed all day.  Feels hives were from zofran which caused hives.  Her appetite has returned and has had a bowel movement.  States reaction didn't start until Monday, which was 3 days after her chemo.      ----- Message from Bibi Tan sent at 4/9/2024  9:21 AM CDT -----  Contact: pt saritha Overton  Pt saritha Overton calling about chemo pt took on thrusday last and pt would in a lot of pain.  She can be reached at [phone].    Thanks,

## 2024-04-11 ENCOUNTER — OFFICE VISIT (OUTPATIENT)
Dept: HEMATOLOGY/ONCOLOGY | Facility: CLINIC | Age: 74
End: 2024-04-11
Payer: MEDICARE

## 2024-04-11 VITALS
BODY MASS INDEX: 21.59 KG/M2 | HEART RATE: 88 BPM | DIASTOLIC BLOOD PRESSURE: 88 MMHG | OXYGEN SATURATION: 96 % | HEIGHT: 62 IN | SYSTOLIC BLOOD PRESSURE: 142 MMHG | RESPIRATION RATE: 18 BRPM | WEIGHT: 117.31 LBS

## 2024-04-11 DIAGNOSIS — R17 ELEVATED BILIRUBIN: ICD-10-CM

## 2024-04-11 DIAGNOSIS — L29.9 PRURITUS: ICD-10-CM

## 2024-04-11 DIAGNOSIS — C25.9 PANCREATIC ADENOCARCINOMA: Primary | ICD-10-CM

## 2024-04-11 DIAGNOSIS — N28.89 RENAL MASS: ICD-10-CM

## 2024-04-11 DIAGNOSIS — G89.3 CANCER RELATED PAIN: ICD-10-CM

## 2024-04-11 PROCEDURE — 1159F MED LIST DOCD IN RCRD: CPT | Mod: CPTII,S$GLB,, | Performed by: NURSE PRACTITIONER

## 2024-04-11 PROCEDURE — 3079F DIAST BP 80-89 MM HG: CPT | Mod: CPTII,S$GLB,, | Performed by: NURSE PRACTITIONER

## 2024-04-11 PROCEDURE — G2211 COMPLEX E/M VISIT ADD ON: HCPCS | Mod: S$GLB,,, | Performed by: NURSE PRACTITIONER

## 2024-04-11 PROCEDURE — 99215 OFFICE O/P EST HI 40 MIN: CPT | Mod: S$GLB,,, | Performed by: NURSE PRACTITIONER

## 2024-04-11 PROCEDURE — 3077F SYST BP >= 140 MM HG: CPT | Mod: CPTII,S$GLB,, | Performed by: NURSE PRACTITIONER

## 2024-04-11 PROCEDURE — 3008F BODY MASS INDEX DOCD: CPT | Mod: CPTII,S$GLB,, | Performed by: NURSE PRACTITIONER

## 2024-04-11 RX ORDER — SODIUM CHLORIDE 0.9 % (FLUSH) 0.9 %
10 SYRINGE (ML) INJECTION
Status: CANCELLED | OUTPATIENT
Start: 2024-04-11

## 2024-04-11 RX ORDER — DEXAMETHASONE 4 MG/1
TABLET ORAL
Qty: 30 TABLET | Refills: 2 | Status: SHIPPED | OUTPATIENT
Start: 2024-04-11 | End: 2024-05-16 | Stop reason: SDUPTHER

## 2024-04-11 RX ORDER — HEPARIN 100 UNIT/ML
500 SYRINGE INTRAVENOUS
Status: CANCELLED | OUTPATIENT
Start: 2024-04-11

## 2024-04-11 NOTE — PROGRESS NOTES
MEDICAL ONCOLOGY INITIAL CONSULTATION NOTE    Patient ID: Luanne Hernández is a 73 y.o. female.    Chief Complaint: Pancreatic adenocarcinoma    HPI: Patient is a 73 year female with no reported PMH who initially presented to [facility] with epigastric abdominal pain ultimately found to have pancreatic mass. MRI  performed and no metastatic lesions found, GI performed EUS. Has been evaluated by Hepato-biliary surgery at  and is consider borderline resectable with plan for dusty-adjuvant chemotherapy. She presents here to establish care close to her house.     Pathology: 3/1/24    Pancreatic head mass, fine needle aspiration (4 smears, 1 ThinPrep, 1   cell block):                                                            - Positive for malignant cells, consistent with ductal adenocarcinoma.         Imaging:     CT C/A/P : 24    Shows an ill-defined low-density mass in the region of the head and uncinate process of the pancreas suspicious for adenocarcinoma. CA 19-9 is over 42,000. There is also diffuse dilation of the pancreatic duct along with pancreatic pseudocyst formation   No evidence of metastatic disease in the chest.           Past Medical History:   Diagnosis Date    Pancreatic cancer      Family History   Problem Relation Age of Onset    Heart failure Mother     Hodgkin's lymphoma Father     Bladder Cancer Brother      Social History     Socioeconomic History    Marital status: Single   Tobacco Use    Smoking status: Former     Current packs/day: 0.00     Types: Cigarettes     Quit date:      Years since quittin.2    Smokeless tobacco: Former   Substance and Sexual Activity    Alcohol use: Not Currently    Drug use: Never     Past Surgical History:   Procedure Laterality Date    LASIK Bilateral          Review of systems:  Review of Systems   Constitutional:  Positive for activity change. Negative for appetite change, chills, diaphoresis, fatigue and unexpected weight change.   HENT:   Negative for congestion, facial swelling, hearing loss, mouth sores, trouble swallowing and voice change.    Eyes:  Negative for photophobia, pain, discharge and itching.   Respiratory:  Negative for apnea, cough, choking, chest tightness and shortness of breath.    Cardiovascular:  Negative for chest pain, palpitations and leg swelling.   Gastrointestinal:  Positive for abdominal pain. Negative for abdominal distention, anal bleeding and blood in stool.   Endocrine: Negative for cold intolerance, heat intolerance, polydipsia and polyphagia.   Genitourinary:  Negative for difficulty urinating, dysuria, flank pain and hematuria.   Musculoskeletal:  Negative for arthralgias, back pain, joint swelling, myalgias, neck pain and neck stiffness.   Skin:  Positive for rash. Negative for color change, pallor and wound.   Allergic/Immunologic: Negative for environmental allergies, food allergies and immunocompromised state.   Neurological:  Negative for dizziness, seizures, facial asymmetry, speech difficulty, light-headedness, numbness and headaches.   Hematological:  Negative for adenopathy. Does not bruise/bleed easily.   Psychiatric/Behavioral:  Negative for agitation, behavioral problems, confusion, decreased concentration and sleep disturbance.            Physical Exam  Vitals and nursing note reviewed.   Constitutional:       General: She is not in acute distress.     Appearance: Normal appearance. She is not ill-appearing.   HENT:      Head: Normocephalic and atraumatic.      Nose: No congestion or rhinorrhea.   Eyes:      General: No scleral icterus.     Extraocular Movements: Extraocular movements intact.      Pupils: Pupils are equal, round, and reactive to light.   Cardiovascular:      Rate and Rhythm: Normal rate and regular rhythm.      Pulses: Normal pulses.      Heart sounds: Normal heart sounds. No murmur heard.     No gallop.   Pulmonary:      Effort: Pulmonary effort is normal. No respiratory distress.       Breath sounds: Normal breath sounds. No stridor. No wheezing or rhonchi.   Abdominal:      General: Bowel sounds are normal. There is no distension.      Palpations: There is no mass.      Tenderness: There is abdominal tenderness. There is no guarding.   Musculoskeletal:         General: No swelling, tenderness, deformity or signs of injury. Normal range of motion.      Cervical back: Normal range of motion and neck supple. No rigidity. No muscular tenderness.      Right lower leg: No edema.      Left lower leg: No edema.   Skin:     General: Skin is warm.      Coloration: Skin is not jaundiced or pale.      Findings: Rash present. No bruising or lesion.   Neurological:      General: No focal deficit present.      Mental Status: She is alert and oriented to person, place, and time.      Cranial Nerves: No cranial nerve deficit.      Sensory: No sensory deficit.      Motor: No weakness.      Gait: Gait normal.   Psychiatric:         Mood and Affect: Mood normal.         Behavior: Behavior normal.         Thought Content: Thought content normal.       Vitals:    04/11/24 0952   BP: (!) 142/88   Pulse: 88   Resp: 18     Body surface area is 1.53 meters squared.    Assessment/Plan:      ECOG 1    Adenocarcinoma arising from pancreatic head and uncinate process:    == Borderline resectable.  No evidence of arterial involvement on CT scan.  No evidence of distant metastatic disease on CT imaging.   Has been evaluated by hepatobiliary surgery in [place] with plan for neoadjuvant chemotherapy.   == Discussed with her and family management guidelines and my recommendation for staging laparoscopy. Baseline  at 42 K at outside hospital  == I discussed with her the intended side effects of treatment giving her opportunity to ask questions.  After detailed discussion of intended side effects we agreed to start gemcitabine Abraxane secondary to her age and performance status.  Prior authorization request for  gemcitabine plus Abraxane plus minus subsequent chemoradiation sent.    ==03/25/2024: Patient to clinic for chemotherapy education on Gemcitabine/Abraxane. She has some right flank pain and jaundice. Family with her reports donell started yesterday.  Labs drawn 3.22/24 reviewed and bili 7.5, labs drawn 3 weeks prior bili was 0.4. Chemotherapy education not provided, pt needs evaluation may need stent placement.  Local ER not able to do MRCP/bili stents, called Dr. Gilmore's office in [place] and spoke to his nurse who reported he will not be back until 3/28/2024 with no coverage until then, called [facility] pt was initially diagnosed, no answer, left messages.  discussed with patient and Dr. Mazariegos, will send referral to [facility] and patient will go to ER.  Pt will still need port placement for chemotherapy, lap for staging, at this time most acute matter is liver function.  ==04/01/2024:  Pt here today to discuss upcoming chemotherapy, side effect profile, risk versus benefits, and expected outcomes have been discussed today. All questions and concerns answered and consents have been signed. Pt taking oxycodone 10mg po q 4 hours per [facility].  Plan to start Gemcitabine/Abraxane as soon as port placed, pt has appt with Dr. Mcqueen today for eval for port placement.  Pt was seen at [facility] on 3/25/2024 and biliary stents placed, she has appt on 4/16/24 at [facility] for removal of plastic stents, metal to remain in place. Discussed with Dr. Mazariegos, plan to start chemo asap once port placed, will defer diagnostic lap initially recommended for staging  ==04/11/2024: Patient started chemotherapy on 4/4/2024, developed pruritic rash to abdomen starting day after chemotherapy which benadryl and calamine lotion has helped.  Likely secondary to Abraxane, will admin dexamethasone today and premedicate with dexamethasone po home medication with each treatment, and add pepcid and benadryl to premedications for subsequent  chemotherapy.  No dyspnea, chest tightness, angioedema or other symptoms. Other treatment options would include FOLFIRINOX which previously has been discussed with Dr. Mazariegos and patient not likely to tolerate well. Will premedicate patient and continue current treatment.  She also had constipation which has now been relieved with increasing stool softener to 2 daily and adding prunes to diet.    2. Pain Management:  == Continue with oxycodone prn  ==Refilled 3/25/2024  ==Avoid acetaminophen     3. Renal Mass  ==Per ct at [facility]  ==Possible renal calculi per CT at [place] vs possible second primary, can work up once patient's pancreatic cancer is stable, current priority is to start chemotherapy as she was recently admitted for obstructive juandice secondary to her pancreatic cancer    4. Advanced Care Planning  ==Pt has power of , will bring in at next visit    5. Constipation  ==Continue current bowel regimen, stool softeners and prunes which is effective  ==opoid related    6. Rash  ==Dexamethasone today  ==Will add premedications to chemotherapy    Plan:  Add benadryl, pepcid and dex to premedications  Dex home premedication sent out  Dexamethasone today at [facility]  Keep previously scheduled appts for labs and chemo  May continue benadryl/calamine lotion prn    Total time spent in counseling and discussion about further management options including relevant lab work, treatment,  prognosis, medications and intended side effects was more than 40 minutes. More than 50% of the time was spent on counseling and coordination of care.  This includes face to face time and non-face to face time preparing to see the patient (eg, review of tests), Obtaining and/or reviewing separately obtained history, Documenting clinical information in the electronic or other health record, Independently interpreting resultsand communicating results to the patient/family/caregiver, or Care coordination.

## 2024-04-11 NOTE — PROGRESS NOTES
MEDICAL ONCOLOGY INITIAL CONSULTATION NOTE    Patient ID: Luanne Hernández is a 73 y.o. female.    Chief Complaint: Pancreatic adenocarcinoma    HPI: Patient is a 73 year female with no reported PMH who initially presented to [facility] with epigastric abdominal pain ultimately found to have pancreatic mass. MRI  performed and no metastatic lesions found, GI performed EUS. Has been evaluated by Hepato-biliary surgery at  and is consider borderline resectable with plan for dusty-adjuvant chemotherapy. She presents here to establish care close to her house.     Pathology: 3/1/24    Pancreatic head mass, fine needle aspiration (4 smears, 1 ThinPrep, 1   cell block):                                                            - Positive for malignant cells, consistent with ductal adenocarcinoma.         Imaging:     CT C/A/P : 24    Shows an ill-defined low-density mass in the region of the head and uncinate process of the pancreas suspicious for adenocarcinoma. CA 19-9 is over 42,000. There is also diffuse dilation of the pancreatic duct along with pancreatic pseudocyst formation   No evidence of metastatic disease in the chest.           Past Medical History:   Diagnosis Date    Pancreatic cancer      Family History   Problem Relation Age of Onset    Heart failure Mother     Hodgkin's lymphoma Father     Bladder Cancer Brother      Social History     Socioeconomic History    Marital status: Single   Tobacco Use    Smoking status: Former     Current packs/day: 0.00     Types: Cigarettes     Quit date:      Years since quittin.2    Smokeless tobacco: Former   Substance and Sexual Activity    Alcohol use: Not Currently    Drug use: Never     Past Surgical History:   Procedure Laterality Date    LASIK Bilateral          Review of systems:  Review of Systems   Constitutional:  Positive for activity change. Negative for appetite change, chills, diaphoresis, fatigue and unexpected weight change.    HENT:  Negative for congestion, facial swelling, hearing loss, mouth sores, trouble swallowing and voice change.    Eyes:  Negative for photophobia, pain, discharge and itching.   Respiratory:  Negative for apnea, cough, choking, chest tightness and shortness of breath.    Cardiovascular:  Negative for chest pain, palpitations and leg swelling.   Gastrointestinal:  Positive for abdominal pain. Negative for abdominal distention, anal bleeding and blood in stool.   Endocrine: Negative for cold intolerance, heat intolerance, polydipsia and polyphagia.   Genitourinary:  Negative for difficulty urinating, dysuria, flank pain and hematuria.   Musculoskeletal:  Negative for arthralgias, back pain, joint swelling, myalgias, neck pain and neck stiffness.   Skin:  Negative for color change, pallor and wound.   Allergic/Immunologic: Negative for environmental allergies, food allergies and immunocompromised state.   Neurological:  Negative for dizziness, seizures, facial asymmetry, speech difficulty, light-headedness, numbness and headaches.   Hematological:  Negative for adenopathy. Does not bruise/bleed easily.   Psychiatric/Behavioral:  Negative for agitation, behavioral problems, confusion, decreased concentration and sleep disturbance.            Physical Exam  Vitals and nursing note reviewed.   Constitutional:       General: She is not in acute distress.     Appearance: Normal appearance. She is not ill-appearing.   HENT:      Head: Normocephalic and atraumatic.      Nose: No congestion or rhinorrhea.   Eyes:      General: No scleral icterus.     Extraocular Movements: Extraocular movements intact.      Pupils: Pupils are equal, round, and reactive to light.   Cardiovascular:      Rate and Rhythm: Normal rate and regular rhythm.      Pulses: Normal pulses.      Heart sounds: Normal heart sounds. No murmur heard.     No gallop.   Pulmonary:      Effort: Pulmonary effort is normal. No respiratory distress.      Breath  sounds: Normal breath sounds. No stridor. No wheezing or rhonchi.   Abdominal:      General: Bowel sounds are normal. There is no distension.      Palpations: There is no mass.      Tenderness: There is abdominal tenderness. There is no guarding.   Musculoskeletal:         General: No swelling, tenderness, deformity or signs of injury. Normal range of motion.      Cervical back: Normal range of motion and neck supple. No rigidity. No muscular tenderness.      Right lower leg: No edema.      Left lower leg: No edema.   Skin:     General: Skin is warm.      Coloration: Skin is not jaundiced or pale.      Findings: No bruising or lesion.   Neurological:      General: No focal deficit present.      Mental Status: She is alert and oriented to person, place, and time.      Cranial Nerves: No cranial nerve deficit.      Sensory: No sensory deficit.      Motor: No weakness.      Gait: Gait normal.   Psychiatric:         Mood and Affect: Mood normal.         Behavior: Behavior normal.         Thought Content: Thought content normal.     Vitals:    04/11/24 0952   BP: (!) 142/88   Pulse: 88   Resp: 18     Body surface area is 1.53 meters squared.    Assessment/Plan:      ECOG 1    Adenocarcinoma arising from pancreatic head and uncinate process:    == Borderline resectable.  No evidence of arterial involvement on CT scan.  No evidence of distant metastatic disease on CT imaging.   Has been evaluated by hepatobiliary surgery in [place] with plan for neoadjuvant chemotherapy.   == Discussed with her and family management guidelines and my recommendation for staging laparoscopy. Baseline  at 42 K at outside hospital  == I discussed with her the intended side effects of treatment giving her opportunity to ask questions.  After detailed discussion of intended side effects we agreed to start gemcitabine Abraxane secondary to her age and performance status.  Prior authorization request for gemcitabine plus Abraxane plus  minus subsequent chemoradiation sent.    ==03/25/2024: Patient to clinic for chemotherapy education on Gemcitabine/Abraxane. She has some right flank pain and jaundice. Family with her reports juandice started yesterday.  Labs drawn 3.22/24 reviewed and bili 7.5, labs drawn 3 weeks prior bili was 0.4. Chemotherapy education not provided, pt needs evaluation may need stent placement.  Local ER not able to do MRCP/bili stents, called Dr. Gilmore's office in [place] and spoke to his nurse who reported he will not be back until 3/28/2024 with no coverage until then, called [facility] where pt was initially diagnosed, no answer, left messages.  discussed with patient and Dr. Mazariegos, will send referral to [facility] and patient will go to ER.  Pt will still need port placement for chemotherapy, lap for staging, at this time most acute matter is liver function.  ==04/01/2024:  Pt here today to discuss upcoming chemotherapy, side effect profile, risk versus benefits, and expected outcomes have been discussed today. All questions and concerns answered and consents have been signed. Pt taking oxycodone 10mg po q 4 hours per [facility].  Plan to start Gemcitabine/Abraxane as soon as port placed, pt has appt with Dr. Mcqueen today for eval for port placement.  Pt was seen at [facility] on 3/25/2024 and biliary stents placed, she has appt on 4/16/24 at [facility] for removal of plastic stents, metal to remain in place. Discussed with Dr. Mazariegos, plan to start chemo asap once port placed, will defer diagnostic lap initially recommended for staging    2. Pain Management:  == Continue with oxycodone prn  ==Refilled 3/25/2024  ==Avoid acetaminophen     3. Renal Mass  ==Per ct at [facility]  ==Possible renal calculi per CT at [place] vs possible second primary, can work up once patient's pancreatic cancer is stable, current priority is to start chemotherapy as she was recently admitted for obstructive juandice secondary to her pancreatic  cancer    4. Advanced Care Planning  ==Pt has power of , will bring in at next visit    Plan:  Chemotherapy asap once port placed  Cbc cmp prior          Total time spent in counseling and discussion about further management options including relevant lab work, treatment,  prognosis, medications and intended side effects was more than 60 minutes. More than 50% of the time was spent on counseling and coordination of care.  This includes face to face time and non-face to face time preparing to see the patient (eg, review of tests), Obtaining and/or reviewing separately obtained history, Documenting clinical information in the electronic or other health record, Independently interpreting resultsand communicating results to the patient/family/caregiver, or Care coordination.

## 2024-04-12 ENCOUNTER — TELEPHONE (OUTPATIENT)
Dept: HEMATOLOGY/ONCOLOGY | Facility: CLINIC | Age: 74
End: 2024-04-12
Payer: MEDICARE

## 2024-04-12 NOTE — TELEPHONE ENCOUNTER
----- Message from Rosa Bojorquez sent at 4/12/2024 10:44 AM CDT -----  Contact: PT  Type:  RX Refill Request    Who Called: Luanne Hernández   Refill or New Rx:REFILL  RX Name and Strength:oxyCODONE-acetaminophen (PERCOCET)  mg per tablet   How is the patient currently taking it? (ex. 1XDay):as needed  Is this a 30 day or 90 day RX:30 day  Preferred Pharmacy with phone number:  [place], [place] - [address]  [address] [facility] [address]  Phone: [phone] Fax: [phone]     Local or Mail Order:LOCAL  Ordering Provider:DELIA  Would the patient rather a call back or a response via [facility]? CALL BACK  Best Call Back Number:[phone]   Additional Information: n/a

## 2024-04-15 DIAGNOSIS — C25.9 PANCREATIC ADENOCARCINOMA: ICD-10-CM

## 2024-04-15 DIAGNOSIS — G89.3 CANCER RELATED PAIN: Primary | ICD-10-CM

## 2024-04-15 RX ORDER — OXYCODONE AND ACETAMINOPHEN 10; 325 MG/1; MG/1
1 TABLET ORAL EVERY 6 HOURS PRN
Qty: 90 TABLET | Refills: 0 | Status: SHIPPED | OUTPATIENT
Start: 2024-04-15 | End: 2024-04-17

## 2024-04-16 DIAGNOSIS — G89.3 CANCER RELATED PAIN: Primary | ICD-10-CM

## 2024-04-16 DIAGNOSIS — C25.9 PANCREATIC ADENOCARCINOMA: Primary | ICD-10-CM

## 2024-04-16 LAB
ALBUMIN SERPL BCP-MCNC: 2.8 G/DL (ref 3.4–5)
ALBUMIN/GLOBULIN RATIO: 0.74 RATIO (ref 1.1–1.8)
ALP SERPL-CCNC: 114 U/L (ref 46–116)
ALT SERPL W P-5'-P-CCNC: 25 U/L (ref 12–78)
ANION GAP SERPL CALC-SCNC: 12 MMOL/L (ref 3–11)
AST SERPL-CCNC: 24 U/L (ref 15–37)
BASOPHILS NFR BLD: 0.6 % (ref 0–3)
BILIRUB SERPL-MCNC: 0.9 MG/DL (ref 0–1)
BUN SERPL-MCNC: 10 MG/DL (ref 7–18)
BUN/CREAT SERPL: 8.69 RATIO (ref 7–18)
CALCIUM SERPL-MCNC: 8.8 MG/DL (ref 8.8–10.5)
CHLORIDE SERPL-SCNC: 99 MMOL/L (ref 100–108)
CO2 SERPL-SCNC: 24 MMOL/L (ref 21–32)
CREAT SERPL-MCNC: 1.15 MG/DL (ref 0.55–1.02)
EOSINOPHIL NFR BLD: 1.5 % (ref 1–3)
ERYTHROCYTE [DISTWIDTH] IN BLOOD BY AUTOMATED COUNT: 15.3 % (ref 12.5–18)
GFR ESTIMATION: 46
GLOBULIN: 3.8 G/DL (ref 2.3–3.5)
GLUCOSE SERPL-MCNC: 153 MG/DL (ref 70–110)
HCT VFR BLD AUTO: 31 % (ref 37–47)
HGB BLD-MCNC: 10 G/DL (ref 12–16)
LYMPHOCYTES NFR BLD: 24.7 % (ref 25–40)
MCH RBC QN AUTO: 30.5 PG (ref 27–31.2)
MCHC RBC AUTO-ENTMCNC: 32.3 G/DL (ref 31.8–35.4)
MCV RBC AUTO: 94.5 FL (ref 80–97)
MONOCYTES NFR BLD: 12.6 % (ref 1–15)
NEUTROPHILS # BLD AUTO: 3.14 10*3/UL (ref 1.8–7.7)
NEUTROPHILS NFR BLD: 59.1 % (ref 37–80)
NUCLEATED RED BLOOD CELLS: 0 %
PLATELETS: 207 10*3/UL (ref 142–424)
POTASSIUM SERPL-SCNC: 4.1 MMOL/L (ref 3.6–5.2)
PROT SERPL-MCNC: 6.6 G/DL (ref 6.4–8.2)
RBC # BLD AUTO: 3.28 10*6/UL (ref 4.2–5.4)
SODIUM BLD-SCNC: 135 MMOL/L (ref 135–145)
WBC # BLD: 5.3 10*3/UL (ref 4.6–10.2)

## 2024-04-17 ENCOUNTER — TELEPHONE (OUTPATIENT)
Dept: HEMATOLOGY/ONCOLOGY | Facility: CLINIC | Age: 74
End: 2024-04-17

## 2024-04-17 DIAGNOSIS — G89.3 CANCER RELATED PAIN: Primary | ICD-10-CM

## 2024-04-17 RX ORDER — OXYCODONE HYDROCHLORIDE 10 MG/1
10 TABLET ORAL EVERY 6 HOURS PRN
Qty: 60 TABLET | Refills: 0 | Status: SHIPPED | OUTPATIENT
Start: 2024-04-17 | End: 2024-05-16 | Stop reason: SDUPTHER

## 2024-04-17 RX ORDER — OXYCODONE HYDROCHLORIDE 10 MG/1
10 TABLET ORAL EVERY 4 HOURS PRN
Qty: 60 TABLET | Refills: 0 | Status: SHIPPED | OUTPATIENT
Start: 2024-04-17 | End: 2024-04-17 | Stop reason: SDUPTHER

## 2024-04-18 ENCOUNTER — OFFICE VISIT (OUTPATIENT)
Dept: HEMATOLOGY/ONCOLOGY | Facility: CLINIC | Age: 74
End: 2024-04-18
Payer: MEDICARE

## 2024-04-18 VITALS
SYSTOLIC BLOOD PRESSURE: 124 MMHG | DIASTOLIC BLOOD PRESSURE: 80 MMHG | WEIGHT: 112.13 LBS | RESPIRATION RATE: 16 BRPM | HEIGHT: 62 IN | BODY MASS INDEX: 20.63 KG/M2 | OXYGEN SATURATION: 97 % | HEART RATE: 98 BPM

## 2024-04-18 DIAGNOSIS — N28.89 RENAL MASS: ICD-10-CM

## 2024-04-18 DIAGNOSIS — C25.9 PANCREATIC ADENOCARCINOMA: Primary | ICD-10-CM

## 2024-04-18 DIAGNOSIS — K59.03 CONSTIPATION DUE TO OPIOID THERAPY: ICD-10-CM

## 2024-04-18 DIAGNOSIS — L29.9 PRURITUS: ICD-10-CM

## 2024-04-18 DIAGNOSIS — T40.2X5A CONSTIPATION DUE TO OPIOID THERAPY: ICD-10-CM

## 2024-04-18 DIAGNOSIS — G89.3 CANCER RELATED PAIN: ICD-10-CM

## 2024-04-18 PROCEDURE — 3079F DIAST BP 80-89 MM HG: CPT | Mod: CPTII,S$GLB,, | Performed by: NURSE PRACTITIONER

## 2024-04-18 PROCEDURE — 3074F SYST BP LT 130 MM HG: CPT | Mod: CPTII,S$GLB,, | Performed by: NURSE PRACTITIONER

## 2024-04-18 PROCEDURE — 99215 OFFICE O/P EST HI 40 MIN: CPT | Mod: S$GLB,,, | Performed by: NURSE PRACTITIONER

## 2024-04-18 PROCEDURE — 1159F MED LIST DOCD IN RCRD: CPT | Mod: CPTII,S$GLB,, | Performed by: NURSE PRACTITIONER

## 2024-04-18 PROCEDURE — 1101F PT FALLS ASSESS-DOCD LE1/YR: CPT | Mod: CPTII,S$GLB,, | Performed by: NURSE PRACTITIONER

## 2024-04-18 PROCEDURE — G2211 COMPLEX E/M VISIT ADD ON: HCPCS | Mod: S$GLB,,, | Performed by: NURSE PRACTITIONER

## 2024-04-18 PROCEDURE — 3008F BODY MASS INDEX DOCD: CPT | Mod: CPTII,S$GLB,, | Performed by: NURSE PRACTITIONER

## 2024-04-18 PROCEDURE — 3288F FALL RISK ASSESSMENT DOCD: CPT | Mod: CPTII,S$GLB,, | Performed by: NURSE PRACTITIONER

## 2024-04-18 PROCEDURE — 1125F AMNT PAIN NOTED PAIN PRSNT: CPT | Mod: CPTII,S$GLB,, | Performed by: NURSE PRACTITIONER

## 2024-04-18 NOTE — PROGRESS NOTES
MEDICAL ONCOLOGY INITIAL CONSULTATION NOTE    Patient ID: Luanne Hernández is a 73 y.o. female.    Chief Complaint: Pancreatic adenocarcinoma    HPI: Patient is a 73 year female with no reported PMH who initially presented to [facility] with epigastric abdominal pain ultimately found to have pancreatic mass. MRI  performed and no metastatic lesions found, GI performed EUS. Has been evaluated by Hepato-biliary surgery at  and is consider borderline resectable with plan for dusty-adjuvant chemotherapy. She presents here to establish care close to her house.     Pathology: 3/1/24    Pancreatic head mass, fine needle aspiration (4 smears, 1 ThinPrep, 1   cell block):                                                            - Positive for malignant cells, consistent with ductal adenocarcinoma.         Imaging:     CT C/A/P : 24    Shows an ill-defined low-density mass in the region of the head and uncinate process of the pancreas suspicious for adenocarcinoma. CA 19-9 is over 42,000. There is also diffuse dilation of the pancreatic duct along with pancreatic pseudocyst formation   No evidence of metastatic disease in the chest.           Past Medical History:   Diagnosis Date    Pancreatic cancer      Family History   Problem Relation Name Age of Onset    Heart failure Mother      Hodgkin's lymphoma Father      Bladder Cancer Brother       Social History     Socioeconomic History    Marital status: Single   Tobacco Use    Smoking status: Former     Current packs/day: 0.00     Types: Cigarettes     Quit date:      Years since quittin.3    Smokeless tobacco: Former   Substance and Sexual Activity    Alcohol use: Not Currently    Drug use: Never     Social Determinants of Health     Financial Resource Strain: Low Risk  (2024)    Received from [facility], [facility]  and [facility]    Overall Financial Resource Strain (CARDIA)     Difficulty of Paying Living Expenses: Not very hard   Food Insecurity: No Food Insecurity (2/25/2024)    [facility], [facility]    Hunger Vital Sign     Worried About Running Out of Food in the Last Year: Never true     Ran Out of Food in the Last Year: Never true   Transportation Needs: No Transportation Needs (2/25/2024)    [facility], [facility]    PRAPARE - Transportation     Lack of Transportation (Medical): No     Lack of Transportation (Non-Medical): No   Housing Stability: Unknown (2/25/2024)    [facility], [facility]    Housing Stability Vital Sign     Unable to Pay for Housing in the Last Year: No     Number of Places Lived in the Last Year: 1     Past Surgical History:   Procedure Laterality Date    LASIK Bilateral          Review of systems:  Review of Systems   Constitutional:  Positive for activity change. Negative for appetite change, chills, diaphoresis, fatigue and unexpected weight change.   HENT:  Negative for congestion, facial swelling, hearing loss, mouth sores, trouble swallowing and voice change.    Eyes:  Negative for photophobia, pain, discharge and itching.   Respiratory:  Negative for apnea, cough, choking, chest tightness and shortness of breath.    Cardiovascular:  Negative for chest pain, palpitations and leg swelling.   Gastrointestinal:  Positive for abdominal pain. Negative for abdominal distention, anal bleeding and blood in stool.    Endocrine: Negative for cold intolerance, heat intolerance, polydipsia and polyphagia.   Genitourinary:  Negative for difficulty urinating, dysuria, flank pain and hematuria.   Musculoskeletal:  Negative for arthralgias, back pain, joint swelling, myalgias, neck pain and neck stiffness.   Skin:  Positive for rash. Negative for color change, pallor and wound.   Allergic/Immunologic: Negative for environmental allergies, food allergies and immunocompromised state.   Neurological:  Negative for dizziness, seizures, facial asymmetry, speech difficulty, light-headedness, numbness and headaches.   Hematological:  Negative for adenopathy. Does not bruise/bleed easily.   Psychiatric/Behavioral:  Negative for agitation, behavioral problems, confusion, decreased concentration and sleep disturbance.            Physical Exam  Vitals and nursing note reviewed.   Constitutional:       General: She is not in acute distress.     Appearance: Normal appearance. She is not ill-appearing.   HENT:      Head: Normocephalic and atraumatic.      Nose: No congestion or rhinorrhea.   Eyes:      General: No scleral icterus.     Extraocular Movements: Extraocular movements intact.      Pupils: Pupils are equal, round, and reactive to light.   Cardiovascular:      Rate and Rhythm: Normal rate and regular rhythm.      Pulses: Normal pulses.      Heart sounds: Normal heart sounds. No murmur heard.     No gallop.   Pulmonary:      Effort: Pulmonary effort is normal. No respiratory distress.      Breath sounds: Normal breath sounds. No stridor. No wheezing or rhonchi.   Abdominal:      General: Bowel sounds are normal. There is no distension.      Palpations: There is no mass.      Tenderness: There is abdominal tenderness. There is no guarding.   Musculoskeletal:         General: No swelling, tenderness, deformity or signs of injury. Normal range of motion.      Cervical back: Normal range of motion and neck supple. No rigidity. No muscular  tenderness.      Right lower leg: No edema.      Left lower leg: No edema.   Skin:     General: Skin is warm.      Coloration: Skin is not jaundiced or pale.      Findings: No bruising, lesion or rash.   Neurological:      General: No focal deficit present.      Mental Status: She is alert and oriented to person, place, and time.      Cranial Nerves: No cranial nerve deficit.      Sensory: No sensory deficit.      Motor: No weakness.      Gait: Gait normal.   Psychiatric:         Mood and Affect: Mood normal.         Behavior: Behavior normal.         Thought Content: Thought content normal.       There were no vitals filed for this visit.    There is no height or weight on file to calculate BSA.    Assessment/Plan:      ECOG 1    Adenocarcinoma arising from pancreatic head and uncinate process:    == Borderline resectable.  No evidence of arterial involvement on CT scan.  No evidence of distant metastatic disease on CT imaging.   Has been evaluated by hepatobiliary surgery in [place] with plan for neoadjuvant chemotherapy.   == Discussed with her and family management guidelines and my recommendation for staging laparoscopy. Baseline  at 42 K at outside hospital  == I discussed with her the intended side effects of treatment giving her opportunity to ask questions.  After detailed discussion of intended side effects we agreed to start gemcitabine Abraxane secondary to her age and performance status.  Prior authorization request for gemcitabine plus Abraxane plus minus subsequent chemoradiation sent.    ==03/25/2024: Patient to clinic for chemotherapy education on Gemcitabine/Abraxane. She has some right flank pain and jaundice. Family with her reports juandice started yesterday.  Labs drawn 3.22/24 reviewed and bili 7.5, labs drawn 3 weeks prior bili was 0.4. Chemotherapy education not provided, pt needs evaluation may need stent placement.  Local ER not able to do MRCP/bili stents, called Dr. Gilmore's  office in [place] and spoke to his nurse who reported he will not be back until 3/28/2024 with no coverage until then, called [facility] where pt was initially diagnosed, no answer, left messages.  discussed with patient and Dr. Mazariegos, will send referral to [facility] and patient will go to ER.  Pt will still need port placement for chemotherapy, lap for staging, at this time most acute matter is liver function.  ==04/01/2024:  Pt here today to discuss upcoming chemotherapy, side effect profile, risk versus benefits, and expected outcomes have been discussed today. All questions and concerns answered and consents have been signed. Pt taking oxycodone 10mg po q 4 hours per [facility].  Plan to start Gemcitabine/Abraxane as soon as port placed, pt has appt with Dr. Mcqueen today for eval for port placement.  Pt was seen at [facility] on 3/25/2024 and biliary stents placed, she has appt on 4/16/24 at [facility] for removal of plastic stents, metal to remain in place. Discussed with Dr. Mazariegos, plan to start chemo asap once port placed, will defer diagnostic lap initially recommended for staging  ==04/11/2024: Patient started chemotherapy on 4/4/2024, developed pruritic rash to abdomen starting day after chemotherapy which benadryl and calamine lotion has helped.  Likely secondary to Abraxane, will admin dexamethasone today and premedicate with dexamethasone po home medication with each treatment, and add pepcid and benadryl to premedications for subsequent chemotherapy.  No dyspnea, chest tightness, angioedema or other symptoms. Other treatment options would include FOLFIRINOX which previously has been discussed with Dr. Mazariegos and patient not likely to tolerate well. Will premedicate patient and continue current treatment.  She also had constipation which has now been relieved with increasing stool softener to 2 daily and adding prunes to diet.  ==04/18/2024: KUB reviewed and forwarded to Dr. Pedro at [facility]. Metal biliary stents in  place, plastic stents not seen, may have passed. Patient to follow up with Dr. Pedro to confirm.  Premedication added to chemotherapy today with benadryl, dexamethasone and pepcid as well as home medication benadryl, pepcid and dexamethasone starting day prior to treatment for 3 days.  Oxycodone refilled, labs reviewed, pt cleared for chemotherapy    2. Pain Management:  == Continue with oxycodone prn  ==Refilled 4/18/24  ==Avoid acetaminophen     3. Renal Mass  ==Per ct at [facility]  ==Possible renal calculi per CT at [place] vs possible second primary, can work up once patient's pancreatic cancer is stable, current priority is to start chemotherapy as she was recently admitted for obstructive juandice secondary to her pancreatic cancer    4. Advanced Care Planning  ==Pt has power of , will bring in at next visit    5. Constipation  ==Continue current bowel regimen, stool softeners and prunes which is effective  ==opoid related    6. Rash  ==Resolved  ==Premedications added to chemotherapy: Dexamethasone, benadryl, pepcid    Plan:  Add benadryl, pepcid and dex to premedications  Dex home premedication starting day prior to chemo for 3 days  Rtc 2 weeks cbc cmp - in kinder day prior or same day if unable to do in kinder  Follow up with [facility] - Dr. Pedro regarding bili stents    Total time spent in counseling and discussion about further management options including relevant lab work, treatment,  prognosis, medications and intended side effects was more than 40 minutes. More than 50% of the time was spent on counseling and coordination of care.  This includes face to face time and non-face to face time preparing to see the patient (eg, review of tests), Obtaining and/or reviewing separately obtained history, Documenting clinical information in the electronic or other health record, Independently interpreting resultsand communicating results to the patient/family/caregiver, or Care coordination.

## 2024-04-29 DIAGNOSIS — C25.9 PANCREATIC ADENOCARCINOMA: Primary | ICD-10-CM

## 2024-05-02 ENCOUNTER — OFFICE VISIT (OUTPATIENT)
Dept: HEMATOLOGY/ONCOLOGY | Facility: CLINIC | Age: 74
End: 2024-05-02
Payer: MEDICARE

## 2024-05-02 VITALS
WEIGHT: 108.88 LBS | HEART RATE: 87 BPM | SYSTOLIC BLOOD PRESSURE: 111 MMHG | BODY MASS INDEX: 19.92 KG/M2 | DIASTOLIC BLOOD PRESSURE: 77 MMHG | OXYGEN SATURATION: 97 %

## 2024-05-02 DIAGNOSIS — G89.3 CANCER RELATED PAIN: ICD-10-CM

## 2024-05-02 DIAGNOSIS — T40.2X5A CONSTIPATION DUE TO OPIOID THERAPY: ICD-10-CM

## 2024-05-02 DIAGNOSIS — K59.03 CONSTIPATION DUE TO OPIOID THERAPY: ICD-10-CM

## 2024-05-02 DIAGNOSIS — C25.9 PANCREATIC ADENOCARCINOMA: Primary | ICD-10-CM

## 2024-05-02 DIAGNOSIS — N28.89 RENAL MASS: ICD-10-CM

## 2024-05-02 PROCEDURE — 3074F SYST BP LT 130 MM HG: CPT | Mod: CPTII,S$GLB,, | Performed by: NURSE PRACTITIONER

## 2024-05-02 PROCEDURE — G2211 COMPLEX E/M VISIT ADD ON: HCPCS | Mod: S$GLB,,, | Performed by: NURSE PRACTITIONER

## 2024-05-02 PROCEDURE — 3078F DIAST BP <80 MM HG: CPT | Mod: CPTII,S$GLB,, | Performed by: NURSE PRACTITIONER

## 2024-05-02 PROCEDURE — 3008F BODY MASS INDEX DOCD: CPT | Mod: CPTII,S$GLB,, | Performed by: NURSE PRACTITIONER

## 2024-05-02 PROCEDURE — 1101F PT FALLS ASSESS-DOCD LE1/YR: CPT | Mod: CPTII,S$GLB,, | Performed by: NURSE PRACTITIONER

## 2024-05-02 PROCEDURE — 1126F AMNT PAIN NOTED NONE PRSNT: CPT | Mod: CPTII,S$GLB,, | Performed by: NURSE PRACTITIONER

## 2024-05-02 PROCEDURE — 99215 OFFICE O/P EST HI 40 MIN: CPT | Mod: S$GLB,,, | Performed by: NURSE PRACTITIONER

## 2024-05-02 PROCEDURE — 3288F FALL RISK ASSESSMENT DOCD: CPT | Mod: CPTII,S$GLB,, | Performed by: NURSE PRACTITIONER

## 2024-05-02 RX ORDER — HEPARIN 100 UNIT/ML
500 SYRINGE INTRAVENOUS
Status: CANCELLED | OUTPATIENT
Start: 2024-05-02

## 2024-05-02 RX ORDER — FAMOTIDINE 10 MG/ML
20 INJECTION INTRAVENOUS
Status: CANCELLED
Start: 2024-05-16

## 2024-05-02 RX ORDER — DIPHENHYDRAMINE HCL 25 MG
25 CAPSULE ORAL
Status: CANCELLED
Start: 2024-05-16

## 2024-05-02 RX ORDER — ONDANSETRON HYDROCHLORIDE 2 MG/ML
8 INJECTION, SOLUTION INTRAVENOUS
Status: CANCELLED | OUTPATIENT
Start: 2024-05-02

## 2024-05-02 RX ORDER — FAMOTIDINE 10 MG/ML
20 INJECTION INTRAVENOUS
Status: CANCELLED
Start: 2024-05-02

## 2024-05-02 RX ORDER — HEPARIN 100 UNIT/ML
500 SYRINGE INTRAVENOUS
Status: CANCELLED | OUTPATIENT
Start: 2024-05-16

## 2024-05-02 RX ORDER — SODIUM CHLORIDE 0.9 % (FLUSH) 0.9 %
10 SYRINGE (ML) INJECTION
Status: CANCELLED | OUTPATIENT
Start: 2024-05-02

## 2024-05-02 RX ORDER — ONDANSETRON HYDROCHLORIDE 2 MG/ML
8 INJECTION, SOLUTION INTRAVENOUS
Status: CANCELLED | OUTPATIENT
Start: 2024-05-16

## 2024-05-02 RX ORDER — SODIUM CHLORIDE 0.9 % (FLUSH) 0.9 %
10 SYRINGE (ML) INJECTION
Status: CANCELLED | OUTPATIENT
Start: 2024-05-16

## 2024-05-02 RX ORDER — DIPHENHYDRAMINE HCL 25 MG
25 CAPSULE ORAL
Status: CANCELLED
Start: 2024-05-02

## 2024-05-02 NOTE — PROGRESS NOTES
MEDICAL ONCOLOGY INITIAL CONSULTATION NOTE    Patient ID: Luanne Hernández is a 73 y.o. female.    Chief Complaint: Pancreatic adenocarcinoma    HPI: Patient is a 73 year female with no reported PMH who initially presented to [facility] with epigastric abdominal pain ultimately found to have pancreatic mass. MRI  performed and no metastatic lesions found, GI performed EUS. Has been evaluated by Hepato-biliary surgery at  and is consider borderline resectable with plan for dusty-adjuvant chemotherapy. She presents here to establish care close to her house.     Pathology: 3/1/24    Pancreatic head mass, fine needle aspiration (4 smears, 1 ThinPrep, 1   cell block):                                                            - Positive for malignant cells, consistent with ductal adenocarcinoma.       Imaging:     CT C/A/P : 24    Shows an ill-defined low-density mass in the region of the head and uncinate process of the pancreas suspicious for adenocarcinoma. CA 19-9 is over 42,000. There is also diffuse dilation of the pancreatic duct along with pancreatic pseudocyst formation   No evidence of metastatic disease in the chest.           Past Medical History:   Diagnosis Date    Pancreatic cancer      Family History   Problem Relation Name Age of Onset    Heart failure Mother      Hodgkin's lymphoma Father      Bladder Cancer Brother       Social History     Socioeconomic History    Marital status: Single   Tobacco Use    Smoking status: Former     Current packs/day: 0.00     Types: Cigarettes     Quit date:      Years since quittin.3    Smokeless tobacco: Former   Substance and Sexual Activity    Alcohol use: Not Currently    Drug use: Never     Social Determinants of Health     Financial Resource Strain: Low Risk  (2024)    Overall Financial Resource Strain (CARDIA)     Difficulty of Paying Living Expenses: Not hard at all   Food Insecurity: No Food Insecurity (2024)    Hunger Vital Sign      Worried About Running Out of Food in the Last Year: Never true     Ran Out of Food in the Last Year: Never true   Transportation Needs: No Transportation Needs (4/30/2024)    PRAPARE - Transportation     Lack of Transportation (Medical): No     Lack of Transportation (Non-Medical): No   Physical Activity: Insufficiently Active (4/30/2024)    Exercise Vital Sign     Days of Exercise per Week: 2 days     Minutes of Exercise per Session: 10 min   Stress: No Stress Concern Present (4/30/2024)    [employer] - Occupational Stress Questionnaire     Feeling of Stress : Not at all   Housing Stability: Unknown (2/25/2024)    Received from [facility], [facility]    Housing Stability Vital Sign     Unable to Pay for Housing in the Last Year: No     Number of Places Lived in the Last Year: 1     Past Surgical History:   Procedure Laterality Date    LASIK Bilateral          Review of systems:  Review of Systems   Constitutional:  Positive for activity change. Negative for appetite change, chills, diaphoresis, fatigue and unexpected weight change.   HENT:  Negative for congestion, facial swelling, hearing loss, mouth sores, trouble swallowing and voice change.    Eyes:  Negative for photophobia, pain, discharge and itching.   Respiratory:  Negative for apnea, cough, choking, chest tightness and shortness of breath.    Cardiovascular:  Negative for chest pain, palpitations and leg swelling.   Gastrointestinal:  Positive for abdominal pain. Negative for abdominal distention, anal bleeding and blood in stool.   Endocrine: Negative for cold intolerance, heat intolerance, polydipsia and polyphagia.   Genitourinary:  Negative for difficulty urinating, dysuria, flank pain and hematuria.   Musculoskeletal:  Negative for arthralgias, back pain, joint swelling,  myalgias, neck pain and neck stiffness.   Skin:  Positive for rash. Negative for color change, pallor and wound.   Allergic/Immunologic: Negative for environmental allergies, food allergies and immunocompromised state.   Neurological:  Negative for dizziness, seizures, facial asymmetry, speech difficulty, light-headedness, numbness and headaches.   Hematological:  Negative for adenopathy. Does not bruise/bleed easily.   Psychiatric/Behavioral:  Negative for agitation, behavioral problems, confusion, decreased concentration and sleep disturbance.            Physical Exam  Vitals and nursing note reviewed.   Constitutional:       General: She is not in acute distress.     Appearance: Normal appearance. She is not ill-appearing.   HENT:      Head: Normocephalic and atraumatic.      Nose: No congestion or rhinorrhea.   Eyes:      General: No scleral icterus.     Extraocular Movements: Extraocular movements intact.      Pupils: Pupils are equal, round, and reactive to light.   Cardiovascular:      Rate and Rhythm: Normal rate and regular rhythm.      Pulses: Normal pulses.      Heart sounds: Normal heart sounds. No murmur heard.     No gallop.   Pulmonary:      Effort: Pulmonary effort is normal. No respiratory distress.      Breath sounds: Normal breath sounds. No stridor. No wheezing or rhonchi.   Abdominal:      General: Bowel sounds are normal. There is no distension.      Palpations: There is no mass.      Tenderness: There is abdominal tenderness. There is no guarding.   Musculoskeletal:         General: No swelling, tenderness, deformity or signs of injury. Normal range of motion.      Cervical back: Normal range of motion and neck supple. No rigidity. No muscular tenderness.      Right lower leg: No edema.      Left lower leg: No edema.   Skin:     General: Skin is warm.      Coloration: Skin is not jaundiced or pale.      Findings: No bruising, lesion or rash.   Neurological:      General: No focal deficit  present.      Mental Status: She is alert and oriented to person, place, and time.      Cranial Nerves: No cranial nerve deficit.      Sensory: No sensory deficit.      Motor: No weakness.      Gait: Gait normal.   Psychiatric:         Mood and Affect: Mood normal.         Behavior: Behavior normal.         Thought Content: Thought content normal.     There were no vitals filed for this visit.    There is no height or weight on file to calculate BSA.    Assessment/Plan:      ECOG 1    Adenocarcinoma arising from pancreatic head and uncinate process:    == Borderline resectable.  No evidence of arterial involvement on CT scan.  No evidence of distant metastatic disease on CT imaging.   Has been evaluated by hepatobiliary surgery in [place] with plan for neoadjuvant chemotherapy.   == Discussed with her and family management guidelines and my recommendation for staging laparoscopy. Baseline  at 42 K at outside hospital  == I discussed with her the intended side effects of treatment giving her opportunity to ask questions.  After detailed discussion of intended side effects we agreed to start gemcitabine Abraxane secondary to her age and performance status.  Prior authorization request for gemcitabine plus Abraxane plus minus subsequent chemoradiation sent.    ==03/25/2024: Patient to clinic for chemotherapy education on Gemcitabine/Abraxane. She has some right flank pain and jaundice. Family with her reports juandice started yesterday.  Labs drawn 3.22/24 reviewed and bili 7.5, labs drawn 3 weeks prior bili was 0.4. Chemotherapy education not provided, pt needs evaluation may need stent placement.  Local ER not able to do MRCP/bili stents, called Dr. Gilmore's office in [place] and spoke to his nurse who reported he will not be back until 3/28/2024 with no coverage until then, called [place] where pt was initially diagnosed, no answer, left messages.  discussed with patient and Dr. Mazariegos, will send  referral to [facility] and patient will go to ER.  Pt will still need port placement for chemotherapy, lap for staging, at this time most acute matter is liver function.  ==04/01/2024:  Pt here today to discuss upcoming chemotherapy, side effect profile, risk versus benefits, and expected outcomes have been discussed today. All questions and concerns answered and consents have been signed. Pt taking oxycodone 10mg po q 4 hours per [facility].  Plan to start Gemcitabine/Abraxane as soon as port placed, pt has appt with Dr. Mcqueen today for eval for port placement.  Pt was seen at [facility] on 3/25/2024 and biliary stents placed, she has appt on 4/16/24 at [facility] for removal of plastic stents, metal to remain in place. Discussed with Dr. Mazariegos, plan to start chemo asap once port placed, will defer diagnostic lap initially recommended for staging  ==04/11/2024: Patient started chemotherapy on 4/4/2024, developed pruritic rash to abdomen starting day after chemotherapy which benadryl and calamine lotion has helped.  Likely secondary to Abraxane, will admin dexamethasone today and premedicate with dexamethasone po home medication with each treatment, and add pepcid and benadryl to premedications for subsequent chemotherapy.  No dyspnea, chest tightness, angioedema or other symptoms. Other treatment options would include FOLFIRINOX which previously has been discussed with Dr. Mazariegos and patient not likely to tolerate well. Will premedicate patient and continue current treatment.  She also had constipation which has now been relieved with increasing stool softener to 2 daily and adding prunes to diet.  ==04/18/2024: KUB reviewed and forwarded to Dr. Pedro at [facility]. Metal biliary stents in place, plastic stents not seen, may have passed. Patient to follow up with Dr. Pedro to confirm.  Premedication added to chemotherapy today with benadryl, dexamethasone and pepcid as well as home medication benadryl, pepcid and dexamethasone starting  day prior to treatment for 3 days.  Oxycodone refilled, labs reviewed, pt cleared for chemotherapy  ==05/02/2024: Tolerating chemotherapy well, patient did well with addition of premedications last cycle, did not develop rash with last cycle. KUB completed this week for eval of stents, forwarded to Dr. Pedro at [facility] to eval if she will have removed.  Labs reviewed, plan to continue chemotherapy, will obtain scans after 2 complete cycles of chemotherapy, needs to be ordered next appt    2. Pain Management:  == Continue with oxycodone prn  ==Refilled 4/18/24  ==Avoid acetaminophen     3. Renal Mass  ==Per ct at [facility]  ==Possible renal calculi per CT at [place] vs possible second primary, can work up once patient's pancreatic cancer is stable, current priority is to start chemotherapy as she was recently admitted for obstructive juandice secondary to her pancreatic cancer    4. Advanced Care Planning  ==Pt has power of , will bring in at next visit    5. Constipation  ==Continue current bowel regimen, stool softeners and prunes which is effective  ==opoid related    6. Rash  ==Resolved  ==Premedications added to chemotherapy: Dexamethasone, benadryl, pepcid    Plan:  Continue benadryl, pepcid and dex premedications and Dex home premedication starting day prior to chemo for 3 days  Rtc 2 weeks cbc cmp - in kinder day prior or same day if unable to do in kinder  Follow up with [facility] - Dr. Pedro regarding bili stents    Total time spent in counseling and discussion about further management options including relevant lab work, treatment,  prognosis, medications and intended side effects was more than 40 minutes. More than 50% of the time was spent on counseling and coordination of care.  This includes face to face time and non-face to face time preparing to see the patient (eg, review of tests), Obtaining and/or reviewing separately obtained history, Documenting clinical information in the  electronic or other health record, Independently interpreting resultsand communicating results to the patient/family/caregiver, or Care coordination.

## 2024-05-09 ENCOUNTER — TELEPHONE (OUTPATIENT)
Dept: HEMATOLOGY/ONCOLOGY | Facility: CLINIC | Age: 74
End: 2024-05-09
Payer: MEDICARE

## 2024-05-09 DIAGNOSIS — C25.9 PANCREATIC ADENOCARCINOMA: Primary | ICD-10-CM

## 2024-05-09 RX ORDER — MEGESTROL ACETATE 40 MG/ML
800 SUSPENSION ORAL DAILY
Qty: 600 ML | Refills: 11 | Status: SHIPPED | OUTPATIENT
Start: 2024-05-09 | End: 2025-05-09

## 2024-05-09 NOTE — TELEPHONE ENCOUNTER
----- Message from Karen Collier NP sent at 5/9/2024  1:09 PM CDT -----  Regarding: RE: Problems and Concerns  Contact: Hellen Dukeradha  I sent out Megace.  (Megestrol is generic).  It isnt a depression medication but it is used for appetite.  The 2 we use are Megace and there is an antihistamine but that one causes drowsiness so Megace is the one I recommend.  I sent to her pharmacy on file.  ----- Message -----  From: Marivel Pena MA  Sent: 5/9/2024  12:50 PM CDT  To: Karen Collier NP  Subject: FW: Problems and Concerns                        Spoke with pt saritha. Home health Nurse came to see the pt today and VS were good. She recommended more intake of calories and protein. Pt still has no appetite. She used to be a light eater. Pt is trying but not eating at all hardly. She is asking about a depression med that enhances appetite. Please advice.  ----- Message -----  From: Nannette Sinha  Sent: 5/9/2024   8:07 AM CDT  To: [facility] Staff  Subject: Problems and Concerns                            Per phone call with Brooklynn, she stated that she has concerns about her weight loss, not eating or taking showers and some other things that came up over the weekend.  Please return call at [phone].    Thanks,  SUSAN

## 2024-05-10 ENCOUNTER — TELEPHONE (OUTPATIENT)
Dept: HEMATOLOGY/ONCOLOGY | Facility: CLINIC | Age: 74
End: 2024-05-10
Payer: MEDICARE

## 2024-05-10 NOTE — TELEPHONE ENCOUNTER
----- Message from Manjula Aly sent at 5/9/2024  1:48 PM CDT -----  Patient niece is calling in regards to medication megestroL (MEGACE) 400 mg/10 mL (40 mg/mL) Susp theres seem to be some confusion please call her back at [phone]

## 2024-05-10 NOTE — TELEPHONE ENCOUNTER
Called Brooklynn and offered an appt today for the pt to be seen by Karen. She denied request as she is at work right now. She will call on Thursday after pt appt.

## 2024-05-13 ENCOUNTER — TELEPHONE (OUTPATIENT)
Dept: HEMATOLOGY/ONCOLOGY | Facility: CLINIC | Age: 74
End: 2024-05-13
Payer: MEDICARE

## 2024-05-14 ENCOUNTER — TELEPHONE (OUTPATIENT)
Dept: HEMATOLOGY/ONCOLOGY | Facility: CLINIC | Age: 74
End: 2024-05-14
Payer: MEDICARE

## 2024-05-14 NOTE — TELEPHONE ENCOUNTER
----- Message from Rosa Bojorquez sent at 5/14/2024 10:39 AM CDT -----  Contact: PT  Patient Call Back    Patient Name: Luanne Hernández    Nature of Call: Pt would like a call back in regard to needing to discuss an outside provider reach out to in regard to a referral for surgery out [facility]    Call Back Number: [phone]    Additional Information:Pt notes call back does not need to be today if not possible.

## 2024-05-16 ENCOUNTER — OFFICE VISIT (OUTPATIENT)
Dept: HEMATOLOGY/ONCOLOGY | Facility: CLINIC | Age: 74
End: 2024-05-16
Payer: MEDICARE

## 2024-05-16 VITALS
RESPIRATION RATE: 16 BRPM | SYSTOLIC BLOOD PRESSURE: 121 MMHG | DIASTOLIC BLOOD PRESSURE: 73 MMHG | HEIGHT: 62 IN | BODY MASS INDEX: 19.76 KG/M2 | HEART RATE: 102 BPM | WEIGHT: 107.38 LBS | OXYGEN SATURATION: 97 %

## 2024-05-16 DIAGNOSIS — C25.9 PANCREATIC ADENOCARCINOMA: ICD-10-CM

## 2024-05-16 DIAGNOSIS — K59.03 CONSTIPATION DUE TO OPIOID THERAPY: ICD-10-CM

## 2024-05-16 DIAGNOSIS — G89.3 CANCER RELATED PAIN: ICD-10-CM

## 2024-05-16 DIAGNOSIS — N28.89 RENAL MASS: Primary | ICD-10-CM

## 2024-05-16 DIAGNOSIS — T40.2X5A CONSTIPATION DUE TO OPIOID THERAPY: ICD-10-CM

## 2024-05-16 PROCEDURE — 1101F PT FALLS ASSESS-DOCD LE1/YR: CPT | Mod: CPTII,S$GLB,, | Performed by: NURSE PRACTITIONER

## 2024-05-16 PROCEDURE — 1126F AMNT PAIN NOTED NONE PRSNT: CPT | Mod: CPTII,S$GLB,, | Performed by: NURSE PRACTITIONER

## 2024-05-16 PROCEDURE — G2211 COMPLEX E/M VISIT ADD ON: HCPCS | Mod: S$GLB,,, | Performed by: NURSE PRACTITIONER

## 2024-05-16 PROCEDURE — 99215 OFFICE O/P EST HI 40 MIN: CPT | Mod: S$GLB,,, | Performed by: NURSE PRACTITIONER

## 2024-05-16 PROCEDURE — 3008F BODY MASS INDEX DOCD: CPT | Mod: CPTII,S$GLB,, | Performed by: NURSE PRACTITIONER

## 2024-05-16 PROCEDURE — 1159F MED LIST DOCD IN RCRD: CPT | Mod: CPTII,S$GLB,, | Performed by: NURSE PRACTITIONER

## 2024-05-16 PROCEDURE — 3078F DIAST BP <80 MM HG: CPT | Mod: CPTII,S$GLB,, | Performed by: NURSE PRACTITIONER

## 2024-05-16 PROCEDURE — 3288F FALL RISK ASSESSMENT DOCD: CPT | Mod: CPTII,S$GLB,, | Performed by: NURSE PRACTITIONER

## 2024-05-16 PROCEDURE — 3074F SYST BP LT 130 MM HG: CPT | Mod: CPTII,S$GLB,, | Performed by: NURSE PRACTITIONER

## 2024-05-16 RX ORDER — DEXAMETHASONE 4 MG/1
TABLET ORAL
Qty: 30 TABLET | Refills: 2 | Status: SHIPPED | OUTPATIENT
Start: 2024-05-16

## 2024-05-16 RX ORDER — DIPHENHYDRAMINE HCL 25 MG
25 CAPSULE ORAL
Status: CANCELLED
Start: 2024-06-13

## 2024-05-16 RX ORDER — HEPARIN 100 UNIT/ML
500 SYRINGE INTRAVENOUS
Status: CANCELLED | OUTPATIENT
Start: 2024-05-30

## 2024-05-16 RX ORDER — SODIUM CHLORIDE 0.9 % (FLUSH) 0.9 %
10 SYRINGE (ML) INJECTION
Status: CANCELLED | OUTPATIENT
Start: 2024-05-30

## 2024-05-16 RX ORDER — OXYCODONE HYDROCHLORIDE 10 MG/1
10 TABLET ORAL EVERY 6 HOURS PRN
Qty: 60 TABLET | Refills: 0 | Status: SHIPPED | OUTPATIENT
Start: 2024-05-16

## 2024-05-16 RX ORDER — ONDANSETRON HYDROCHLORIDE 2 MG/ML
8 INJECTION, SOLUTION INTRAVENOUS
Status: CANCELLED | OUTPATIENT
Start: 2024-06-13

## 2024-05-16 RX ORDER — FAMOTIDINE 10 MG/ML
20 INJECTION INTRAVENOUS
Status: CANCELLED
Start: 2024-06-13

## 2024-05-16 RX ORDER — PANTOPRAZOLE SODIUM 40 MG/1
40 TABLET, DELAYED RELEASE ORAL DAILY
Qty: 30 TABLET | Refills: 11 | Status: SHIPPED | OUTPATIENT
Start: 2024-05-16

## 2024-05-16 RX ORDER — DIPHENHYDRAMINE HCL 25 MG
25 CAPSULE ORAL
Status: CANCELLED
Start: 2024-05-30

## 2024-05-16 RX ORDER — ONDANSETRON HYDROCHLORIDE 2 MG/ML
8 INJECTION, SOLUTION INTRAVENOUS
Status: CANCELLED | OUTPATIENT
Start: 2024-05-30

## 2024-05-16 RX ORDER — SODIUM CHLORIDE 0.9 % (FLUSH) 0.9 %
10 SYRINGE (ML) INJECTION
Status: CANCELLED | OUTPATIENT
Start: 2024-06-13

## 2024-05-16 RX ORDER — HEPARIN 100 UNIT/ML
500 SYRINGE INTRAVENOUS
Status: CANCELLED | OUTPATIENT
Start: 2024-06-13

## 2024-05-16 RX ORDER — FAMOTIDINE 10 MG/ML
20 INJECTION INTRAVENOUS
Status: CANCELLED
Start: 2024-05-30

## 2024-05-16 NOTE — PROGRESS NOTES
MEDICAL ONCOLOGY INITIAL CONSULTATION NOTE    Patient ID: Luanne Hernández is a 73 y.o. female.    Chief Complaint: Pancreatic adenocarcinoma    HPI: Patient is a 73 year female with no reported PMH who initially presented to [facility] with epigastric abdominal pain ultimately found to have pancreatic mass. MRI  performed and no metastatic lesions found, GI performed EUS. Has been evaluated by Hepato-biliary surgery at  and is consider borderline resectable with plan for dusty-adjuvant chemotherapy. She presents here to establish care close to her house.     Pathology: 3/1/24    Pancreatic head mass, fine needle aspiration (4 smears, 1 ThinPrep, 1   cell block):                                                            - Positive for malignant cells, consistent with ductal adenocarcinoma.       Imaging:     CT C/A/P : 24    Shows an ill-defined low-density mass in the region of the head and uncinate process of the pancreas suspicious for adenocarcinoma. CA 19-9 is over 42,000. There is also diffuse dilation of the pancreatic duct along with pancreatic pseudocyst formation   No evidence of metastatic disease in the chest.           Past Medical History:   Diagnosis Date    Pancreatic cancer      Family History   Problem Relation Name Age of Onset    Heart failure Mother      Hodgkin's lymphoma Father      Bladder Cancer Brother       Social History     Socioeconomic History    Marital status: Single   Tobacco Use    Smoking status: Former     Current packs/day: 0.00     Types: Cigarettes     Quit date:      Years since quittin.3    Smokeless tobacco: Former   Substance and Sexual Activity    Alcohol use: Not Currently    Drug use: Never     Social Determinants of Health     Financial Resource Strain: Low Risk  (2024)    Overall Financial Resource Strain (CARDIA)     Difficulty of Paying Living Expenses: Not hard at all   Food Insecurity: No Food Insecurity (2024)    Hunger Vital Sign      Worried About Running Out of Food in the Last Year: Never true     Ran Out of Food in the Last Year: Never true   Transportation Needs: No Transportation Needs (4/30/2024)    PRAPARE - Transportation     Lack of Transportation (Medical): No     Lack of Transportation (Non-Medical): No   Physical Activity: Insufficiently Active (4/30/2024)    Exercise Vital Sign     Days of Exercise per Week: 2 days     Minutes of Exercise per Session: 10 min   Stress: No Stress Concern Present (4/30/2024)    [employer] [employer] - Occupational Stress Questionnaire     Feeling of Stress : Not at all   Housing Stability: Unknown (2/25/2024)    Received from [facility], [employer] [facility]    Housing Stability Vital Sign     Unable to Pay for Housing in the Last Year: No     Number of Places Lived in the Last Year: 1     Past Surgical History:   Procedure Laterality Date    LASIK Bilateral          Review of systems:  Review of Systems   Constitutional:  Positive for activity change. Negative for appetite change, chills, diaphoresis, fatigue and unexpected weight change.   HENT:  Negative for congestion, facial swelling, hearing loss, mouth sores, trouble swallowing and voice change.    Eyes:  Negative for photophobia, pain, discharge and itching.   Respiratory:  Negative for apnea, cough, choking, chest tightness and shortness of breath.    Cardiovascular:  Negative for chest pain, palpitations and leg swelling.   Gastrointestinal:  Positive for abdominal pain. Negative for abdominal distention, anal bleeding and blood in stool.   Endocrine: Negative for cold intolerance, heat intolerance, polydipsia and polyphagia.   Genitourinary:  Negative for difficulty urinating, dysuria, flank pain and hematuria.   Musculoskeletal:  Negative for arthralgias, back pain, joint swelling,  myalgias, neck pain and neck stiffness.   Skin:  Positive for rash. Negative for color change, pallor and wound.   Allergic/Immunologic: Negative for environmental allergies, food allergies and immunocompromised state.   Neurological:  Negative for dizziness, seizures, facial asymmetry, speech difficulty, light-headedness, numbness and headaches.   Hematological:  Negative for adenopathy. Does not bruise/bleed easily.   Psychiatric/Behavioral:  Negative for agitation, behavioral problems, confusion, decreased concentration and sleep disturbance.            Physical Exam  Vitals and nursing note reviewed.   Constitutional:       General: She is not in acute distress.     Appearance: Normal appearance. She is not ill-appearing.   HENT:      Head: Normocephalic and atraumatic.      Nose: No congestion or rhinorrhea.   Eyes:      General: No scleral icterus.     Extraocular Movements: Extraocular movements intact.      Pupils: Pupils are equal, round, and reactive to light.   Cardiovascular:      Rate and Rhythm: Normal rate and regular rhythm.      Pulses: Normal pulses.      Heart sounds: Normal heart sounds. No murmur heard.     No gallop.   Pulmonary:      Effort: Pulmonary effort is normal. No respiratory distress.      Breath sounds: Normal breath sounds. No stridor. No wheezing or rhonchi.   Abdominal:      General: Bowel sounds are normal. There is no distension.      Palpations: There is no mass.      Tenderness: There is abdominal tenderness. There is no guarding.   Musculoskeletal:         General: No swelling, tenderness, deformity or signs of injury. Normal range of motion.      Cervical back: Normal range of motion and neck supple. No rigidity. No muscular tenderness.      Right lower leg: No edema.      Left lower leg: No edema.   Skin:     General: Skin is warm.      Coloration: Skin is not jaundiced or pale.      Findings: No bruising, lesion or rash.   Neurological:      General: No focal deficit  present.      Mental Status: She is alert and oriented to person, place, and time.      Cranial Nerves: No cranial nerve deficit.      Sensory: No sensory deficit.      Motor: No weakness.      Gait: Gait normal.   Psychiatric:         Mood and Affect: Mood normal.         Behavior: Behavior normal.         Thought Content: Thought content normal.     Vitals:    05/16/24 0815   BP: 121/73   Pulse: 102   Resp: 16       Body surface area is 1.46 meters squared.    Assessment/Plan:      ECOG 1    Adenocarcinoma arising from pancreatic head and uncinate process:    == Borderline resectable.  No evidence of arterial involvement on CT scan.  No evidence of distant metastatic disease on CT imaging.   Has been evaluated by hepatobiliary surgery in [place] with plan for neoadjuvant chemotherapy.   == Discussed with her and family management guidelines and my recommendation for staging laparoscopy. Baseline  at 42 K at outside hospital  == I discussed with her the intended side effects of treatment giving her opportunity to ask questions.  After detailed discussion of intended side effects we agreed to start gemcitabine Abraxane secondary to her age and performance status.  Prior authorization request for gemcitabine plus Abraxane plus minus subsequent chemoradiation sent.    ==03/25/2024: Patient to clinic for chemotherapy education on Gemcitabine/Abraxane. She has some right flank pain and jaundice. Family with her reports juandice started yesterday.  Labs drawn 3.22/24 reviewed and bili 7.5, labs drawn 3 weeks prior bili was 0.4. Chemotherapy education not provided, pt needs evaluation may need stent placement.  Local ER not able to do MRCP/bili stents, called Dr. Gilmore's office in [place] and spoke to his nurse who reported he will not be back until 3/28/2024 with no coverage until then, called [place] where pt was initially diagnosed, no answer, left messages.  discussed with patient and Dr. Mazariegos, will  send referral to [facility] and patient will go to ER.  Pt will still need port placement for chemotherapy, lap for staging, at this time most acute matter is liver function.  ==04/01/2024:  Pt here today to discuss upcoming chemotherapy, side effect profile, risk versus benefits, and expected outcomes have been discussed today. All questions and concerns answered and consents have been signed. Pt taking oxycodone 10mg po q 4 hours per [facility].  Plan to start Gemcitabine/Abraxane as soon as port placed, pt has appt with Dr. Mcqueen today for eval for port placement.  Pt was seen at [facility] on 3/25/2024 and biliary stents placed, she has appt on 4/16/24 at [facility] for removal of plastic stents, metal to remain in place. Discussed with Dr. Mazariegos, plan to start chemo asap once port placed, will defer diagnostic lap initially recommended for staging  ==04/11/2024: Patient started chemotherapy on 4/4/2024, developed pruritic rash to abdomen starting day after chemotherapy which benadryl and calamine lotion has helped.  Likely secondary to Abraxane, will admin dexamethasone today and premedicate with dexamethasone po home medication with each treatment, and add pepcid and benadryl to premedications for subsequent chemotherapy.  No dyspnea, chest tightness, angioedema or other symptoms. Other treatment options would include FOLFIRINOX which previously has been discussed with Dr. Mazariegos and patient not likely to tolerate well. Will premedicate patient and continue current treatment.  She also had constipation which has now been relieved with increasing stool softener to 2 daily and adding prunes to diet.  ==04/18/2024: KUB reviewed and forwarded to Dr. Pedro at [facility]. Metal biliary stents in place, plastic stents not seen, may have passed. Patient to follow up with Dr. Pedro to confirm.  Premedication added to chemotherapy today with benadryl, dexamethasone and pepcid as well as home medication benadryl, pepcid and dexamethasone  starting day prior to treatment for 3 days.  Oxycodone refilled, labs reviewed, pt cleared for chemotherapy  ==05/02/2024: Tolerating chemotherapy well, patient did well with addition of premedications last cycle, did not develop rash with last cycle. KUB completed this week for eval of stents, forwarded to Dr. Pedro at [facility] to eval if she will have removed.  Labs reviewed, plan to continue chemotherapy, will obtain scans after 2 complete cycles of chemotherapy, needs to be ordered next appt  ==05/16/2024: Patient tolerating Amelia/abraxane well. Reports improvement in appetite and energy.  Has not spoken to [facility] since last visit, she had cxr on 4/30/24 which was forwarded to Dr. Pedro. Encouraged pt to reach out to make sure she gets follow up. She received call from surgeon in [facility] from referral placed in March, current plan is neoadjuvant chemotherapy, she is on cycle 2, after 3 cycles will repeat scans.  Labs reviewed, pt is cleared for chemotherapy.      2. Pain Management:  == Continue with oxycodone prn  ==Refilled 4/18/24  ==Avoid acetaminophen     3. Renal Mass  ==Per ct at [facility]  ==Possible renal calculi per CT at [facility] vs possible second primary, can work up once patient's pancreatic cancer is stable, current priority is to start chemotherapy as she was recently admitted for obstructive juandice secondary to her pancreatic cancer    4. Advanced Care Planning  ==Pt has power of , will bring in at next visit    5. Constipation  ==Continue current bowel regimen, stool softeners and prunes which is effective  ==opoid related    6. Rash  ==Resolved  ==Premedications added to chemotherapy: Dexamethasone, benadryl, pepcid    Plan:  Continue benadryl, pepcid and dex premedications and Dex home premedication starting day prior to chemo for 3 days  Rtc 2 weeks cbc cmp - in kinder day prior or same day if unable to do in kinder  Follow up with [facility] - Dr. Pedro regarding bili  stents    Total time spent in counseling and discussion about further management options including relevant lab work, treatment,  prognosis, medications and intended side effects was more than 40 minutes. More than 50% of the time was spent on counseling and coordination of care.  This includes face to face time and non-face to face time preparing to see the patient (eg, review of tests), Obtaining and/or reviewing separately obtained history, Documenting clinical information in the electronic or other health record, Independently interpreting resultsand communicating results to the patient/family/caregiver, or Care coordination.

## 2024-05-20 ENCOUNTER — TELEPHONE (OUTPATIENT)
Dept: HEMATOLOGY/ONCOLOGY | Facility: CLINIC | Age: 74
End: 2024-05-20
Payer: MEDICARE

## 2024-05-30 ENCOUNTER — DOCUMENTATION ONLY (OUTPATIENT)
Dept: HEMATOLOGY/ONCOLOGY | Facility: CLINIC | Age: 74
End: 2024-05-30

## 2024-05-30 ENCOUNTER — OFFICE VISIT (OUTPATIENT)
Dept: HEMATOLOGY/ONCOLOGY | Facility: CLINIC | Age: 74
End: 2024-05-30
Payer: MEDICARE

## 2024-05-30 VITALS
HEART RATE: 101 BPM | HEIGHT: 62 IN | DIASTOLIC BLOOD PRESSURE: 75 MMHG | WEIGHT: 103.81 LBS | OXYGEN SATURATION: 93 % | SYSTOLIC BLOOD PRESSURE: 114 MMHG | BODY MASS INDEX: 19.1 KG/M2

## 2024-05-30 DIAGNOSIS — K59.03 CONSTIPATION DUE TO OPIOID THERAPY: ICD-10-CM

## 2024-05-30 DIAGNOSIS — T40.2X5A CONSTIPATION DUE TO OPIOID THERAPY: ICD-10-CM

## 2024-05-30 DIAGNOSIS — C25.9 PANCREATIC ADENOCARCINOMA: Primary | ICD-10-CM

## 2024-05-30 DIAGNOSIS — N28.89 RENAL MASS: ICD-10-CM

## 2024-05-30 DIAGNOSIS — G89.3 CANCER RELATED PAIN: ICD-10-CM

## 2024-05-30 PROCEDURE — 99215 OFFICE O/P EST HI 40 MIN: CPT | Mod: S$GLB,,, | Performed by: NURSE PRACTITIONER

## 2024-05-30 PROCEDURE — G2211 COMPLEX E/M VISIT ADD ON: HCPCS | Mod: S$GLB,,, | Performed by: NURSE PRACTITIONER

## 2024-05-30 PROCEDURE — 1159F MED LIST DOCD IN RCRD: CPT | Mod: CPTII,S$GLB,, | Performed by: NURSE PRACTITIONER

## 2024-05-30 PROCEDURE — 3008F BODY MASS INDEX DOCD: CPT | Mod: CPTII,S$GLB,, | Performed by: NURSE PRACTITIONER

## 2024-05-30 PROCEDURE — 3074F SYST BP LT 130 MM HG: CPT | Mod: CPTII,S$GLB,, | Performed by: NURSE PRACTITIONER

## 2024-05-30 PROCEDURE — 3078F DIAST BP <80 MM HG: CPT | Mod: CPTII,S$GLB,, | Performed by: NURSE PRACTITIONER

## 2024-05-30 NOTE — PROGRESS NOTES
MEDICAL ONCOLOGY INITIAL CONSULTATION NOTE    Patient ID: Luanne Hernández is a 73 y.o. female.    Chief Complaint: Pancreatic adenocarcinoma    HPI: Patient is a 73 year female with no reported PMH who initially presented to [facility] with epigastric abdominal pain ultimately found to have pancreatic mass. MRI  performed and no metastatic lesions found, GI performed EUS. Has been evaluated by Hepato-biliary surgery at  and is consider borderline resectable with plan for dusty-adjuvant chemotherapy. She presents here to establish care close to her house.     Pathology: 3/1/24    Pancreatic head mass, fine needle aspiration (4 smears, 1 ThinPrep, 1   cell block):                                                            - Positive for malignant cells, consistent with ductal adenocarcinoma.       Imaging:     CT C/A/P : 24    Shows an ill-defined low-density mass in the region of the head and uncinate process of the pancreas suspicious for adenocarcinoma. CA 19-9 is over 42,000. There is also diffuse dilation of the pancreatic duct along with pancreatic pseudocyst formation   No evidence of metastatic disease in the chest.           Past Medical History:   Diagnosis Date    Pancreatic cancer      Family History   Problem Relation Name Age of Onset    Heart failure Mother      Hodgkin's lymphoma Father      Bladder Cancer Brother       Social History     Socioeconomic History    Marital status: Single   Tobacco Use    Smoking status: Former     Current packs/day: 0.00     Types: Cigarettes     Quit date:      Years since quittin.4    Smokeless tobacco: Former   Substance and Sexual Activity    Alcohol use: Not Currently    Drug use: Never     Social Determinants of Health     Financial Resource Strain: Low Risk  (2024)    Overall Financial Resource Strain (CARDIA)     Difficulty of Paying Living Expenses: Not hard at all   Food Insecurity: No Food Insecurity (2024)    Hunger Vital Sign      Worried About Running Out of Food in the Last Year: Never true     Ran Out of Food in the Last Year: Never true   Transportation Needs: No Transportation Needs (4/30/2024)    PRAPARE - Transportation     Lack of Transportation (Medical): No     Lack of Transportation (Non-Medical): No   Physical Activity: Insufficiently Active (4/30/2024)    Exercise Vital Sign     Days of Exercise per Week: 2 days     Minutes of Exercise per Session: 10 min   Stress: No Stress Concern Present (4/30/2024)    [employer] - Occupational Stress Questionnaire     Feeling of Stress : Not at all   Housing Stability: Unknown (2/25/2024)    Received from [facility], [facility]    Housing Stability Vital Sign     Unable to Pay for Housing in the Last Year: No     Number of Places Lived in the Last Year: 1     Past Surgical History:   Procedure Laterality Date    LASIK Bilateral          Review of systems:  Review of Systems   Constitutional:  Positive for activity change. Negative for appetite change, chills, diaphoresis, fatigue and unexpected weight change.   HENT:  Negative for congestion, facial swelling, hearing loss, mouth sores, trouble swallowing and voice change.    Eyes:  Negative for photophobia, pain, discharge and itching.   Respiratory:  Negative for apnea, cough, choking, chest tightness and shortness of breath.    Cardiovascular:  Negative for chest pain, palpitations and leg swelling.   Gastrointestinal:  Positive for abdominal pain. Negative for abdominal distention, anal bleeding and blood in stool.   Endocrine: Negative for cold intolerance, heat intolerance, polydipsia and polyphagia.   Genitourinary:  Negative for difficulty urinating, dysuria, flank pain and hematuria.   Musculoskeletal:  Negative for arthralgias, back pain, joint swelling,  myalgias, neck pain and neck stiffness.   Skin:  Positive for rash. Negative for color change, pallor and wound.   Allergic/Immunologic: Negative for environmental allergies, food allergies and immunocompromised state.   Neurological:  Negative for dizziness, seizures, facial asymmetry, speech difficulty, light-headedness, numbness and headaches.   Hematological:  Negative for adenopathy. Does not bruise/bleed easily.   Psychiatric/Behavioral:  Negative for agitation, behavioral problems, confusion, decreased concentration and sleep disturbance.            Physical Exam  Vitals and nursing note reviewed.   Constitutional:       General: She is not in acute distress.     Appearance: Normal appearance. She is not ill-appearing.   HENT:      Head: Normocephalic and atraumatic.      Nose: No congestion or rhinorrhea.   Eyes:      General: No scleral icterus.     Extraocular Movements: Extraocular movements intact.      Pupils: Pupils are equal, round, and reactive to light.   Cardiovascular:      Rate and Rhythm: Normal rate and regular rhythm.      Pulses: Normal pulses.      Heart sounds: Normal heart sounds. No murmur heard.     No gallop.   Pulmonary:      Effort: Pulmonary effort is normal. No respiratory distress.      Breath sounds: Normal breath sounds. No stridor. No wheezing or rhonchi.   Abdominal:      General: Bowel sounds are normal. There is no distension.      Palpations: There is no mass.      Tenderness: There is abdominal tenderness. There is no guarding.   Musculoskeletal:         General: No swelling, tenderness, deformity or signs of injury. Normal range of motion.      Cervical back: Normal range of motion and neck supple. No rigidity. No muscular tenderness.      Right lower leg: No edema.      Left lower leg: No edema.   Skin:     General: Skin is warm.      Coloration: Skin is not jaundiced or pale.      Findings: No bruising, lesion or rash.   Neurological:      General: No focal deficit  present.      Mental Status: She is alert and oriented to person, place, and time.      Cranial Nerves: No cranial nerve deficit.      Sensory: No sensory deficit.      Motor: No weakness.      Gait: Gait normal.   Psychiatric:         Mood and Affect: Mood normal.         Behavior: Behavior normal.         Thought Content: Thought content normal.     There were no vitals filed for this visit.      There is no height or weight on file to calculate BSA.    Assessment/Plan:      ECOG 1    Adenocarcinoma arising from pancreatic head and uncinate process:    == Borderline resectable.  No evidence of arterial involvement on CT scan.  No evidence of distant metastatic disease on CT imaging.   Has been evaluated by hepatobiliary surgery in [place] with plan for neoadjuvant chemotherapy.   == Discussed with her and family management guidelines and my recommendation for staging laparoscopy. Baseline  at 42 K at outside hospital  == I discussed with her the intended side effects of treatment giving her opportunity to ask questions.  After detailed discussion of intended side effects we agreed to start gemcitabine Abraxane secondary to her age and performance status.  Prior authorization request for gemcitabine plus Abraxane plus minus subsequent chemoradiation sent.    ==03/25/2024: Patient to clinic for chemotherapy education on Gemcitabine/Abraxane. She has some right flank pain and jaundice. Family with her reports juandice started yesterday.  Labs drawn 3.22/24 reviewed and bili 7.5, labs drawn 3 weeks prior bili was 0.4. Chemotherapy education not provided, pt needs evaluation may need stent placement.  Local ER not able to do MRCP/bili stents, called Dr. Gilmore's office in [place] and spoke to his nurse who reported he will not be back until 3/28/2024 with no coverage until then, called [place] where pt was initially diagnosed, no answer, left messages.  discussed with patient and Dr. Mazariegos, will send  referral to [facility] and patient will go to ER.  Pt will still need port placement for chemotherapy, lap for staging, at this time most acute matter is liver function.  ==04/01/2024:  Pt here today to discuss upcoming chemotherapy, side effect profile, risk versus benefits, and expected outcomes have been discussed today. All questions and concerns answered and consents have been signed. Pt taking oxycodone 10mg po q 4 hours per [facility].  Plan to start Gemcitabine/Abraxane as soon as port placed, pt has appt with Dr. Mcqueen today for eval for port placement.  Pt was seen at [facility] on 3/25/2024 and biliary stents placed, she has appt on 4/16/24 at [facility] for removal of plastic stents, metal to remain in place. Discussed with Dr. Mazariegos, plan to start chemo asap once port placed, will defer diagnostic lap initially recommended for staging  ==04/11/2024: Patient started chemotherapy on 4/4/2024, developed pruritic rash to abdomen starting day after chemotherapy which benadryl and calamine lotion has helped.  Likely secondary to Abraxane, will admin dexamethasone today and premedicate with dexamethasone po home medication with each treatment, and add pepcid and benadryl to premedications for subsequent chemotherapy.  No dyspnea, chest tightness, angioedema or other symptoms. Other treatment options would include FOLFIRINOX which previously has been discussed with Dr. Mazariegos and patient not likely to tolerate well. Will premedicate patient and continue current treatment.  She also had constipation which has now been relieved with increasing stool softener to 2 daily and adding prunes to diet.  ==04/18/2024: KUB reviewed and forwarded to Dr. Pedro at [facility]. Metal biliary stents in place, plastic stents not seen, may have passed. Patient to follow up with Dr. Pedro to confirm.  Premedication added to chemotherapy today with benadryl, dexamethasone and pepcid as well as home medication benadryl, pepcid and dexamethasone starting  day prior to treatment for 3 days.  Oxycodone refilled, labs reviewed, pt cleared for chemotherapy  ==05/02/2024: Tolerating chemotherapy well, patient did well with addition of premedications last cycle, did not develop rash with last cycle. KUB completed this week for eval of stents, forwarded to Dr. Pedro at [facility] to eval if she will have removed.  Labs reviewed, plan to continue chemotherapy, will obtain scans after 2 complete cycles of chemotherapy, needs to be ordered next appt  ==05/16/2024: Patient tolerating [place]/abraxane well. Reports improvement in appetite and energy.  Has not spoken to [facility] since last visit, she had cxr on 4/30/24 which was forwarded to Dr. Pedro. Encouraged pt to reach out to make sure she gets follow up. She received call from surgeon in [facility] from referral placed in March, current plan is neoadjuvant chemotherapy, she is on cycle 2, after 3 cycles will repeat scans.  Labs reviewed, pt is cleared for chemotherapy.    ==05/30/2024: ANC 2446, wbc 4.21, hgb 10.6, plt 225. Scans due after completion of cycle 3 (end of June). Pt presents for C3D1 of Gemcitabine/abraxane, tolerating well. Reports Dr. Pedro at [facility] has not received her KUB to eval stents, will have resent.  Appetite improved without megace, constipation improved, pain improved, will continue chemotherapy    2. Pain Management:  == Continue with oxycodone prn  ==Refilled 4/18/24  ==Avoid acetaminophen     3. Renal Mass  ==Per ct at [facility]  ==Possible renal calculi per CT at [place] vs possible second primary, can work up once patient's pancreatic cancer is stable, current priority is to start chemotherapy as she was recently admitted for obstructive juandice secondary to her pancreatic cancer    4. Advanced Care Planning  ==Pt has power of , will bring in at next visit    5. Constipation  ==Continue current bowel regimen, stool softeners and prunes which is effective  ==opoid related    6.  Rash  ==Resolved  ==Premedications added to chemotherapy: Dexamethasone, benadryl, pepcid    Plan:  Continue benadryl, pepcid and dex premedications and Dex home premedication starting day prior to chemo for 3 days  Rtc 2 weeks cbc cmp, ca 19-9 - in kinder day prior or same day if unable to do in kinder  Follow up with [facility] - Dr. Pedro regarding bili stents fax number [phone]    Total time spent in counseling and discussion about further management options including relevant lab work, treatment,  prognosis, medications and intended side effects was more than 40 minutes. More than 50% of the time was spent on counseling and coordination of care.  This includes face to face time and non-face to face time preparing to see the patient (eg, review of tests), Obtaining and/or reviewing separately obtained history, Documenting clinical information in the electronic or other health record, Independently interpreting resultsand communicating results to the patient/family/caregiver, or Care coordination.

## 2024-05-30 NOTE — PROGRESS NOTES
Faxed copy of most recent KUB report to Dr. Pedro at [facility] [phone]    [place]  5/30/24 @10:12am

## 2024-06-13 ENCOUNTER — OFFICE VISIT (OUTPATIENT)
Dept: HEMATOLOGY/ONCOLOGY | Facility: CLINIC | Age: 74
End: 2024-06-13
Payer: MEDICARE

## 2024-06-13 VITALS
DIASTOLIC BLOOD PRESSURE: 79 MMHG | HEART RATE: 73 BPM | RESPIRATION RATE: 16 BRPM | WEIGHT: 105.38 LBS | BODY MASS INDEX: 19.39 KG/M2 | SYSTOLIC BLOOD PRESSURE: 128 MMHG | HEIGHT: 62 IN | OXYGEN SATURATION: 98 %

## 2024-06-13 DIAGNOSIS — C25.0 MALIGNANT NEOPLASM OF HEAD OF PANCREAS: ICD-10-CM

## 2024-06-13 DIAGNOSIS — K59.03 CONSTIPATION DUE TO OPIOID THERAPY: ICD-10-CM

## 2024-06-13 DIAGNOSIS — C25.9 PANCREATIC ADENOCARCINOMA: Primary | ICD-10-CM

## 2024-06-13 DIAGNOSIS — N28.89 RENAL MASS: ICD-10-CM

## 2024-06-13 DIAGNOSIS — C25.0 MALIGNANT NEOPLASM OF HEAD OF PANCREAS: Primary | ICD-10-CM

## 2024-06-13 DIAGNOSIS — T40.2X5A CONSTIPATION DUE TO OPIOID THERAPY: ICD-10-CM

## 2024-06-13 DIAGNOSIS — L29.9 PRURITUS: ICD-10-CM

## 2024-06-13 DIAGNOSIS — G89.3 CANCER RELATED PAIN: ICD-10-CM

## 2024-06-13 PROCEDURE — 3008F BODY MASS INDEX DOCD: CPT | Mod: CPTII,S$GLB,, | Performed by: NURSE PRACTITIONER

## 2024-06-13 PROCEDURE — 1101F PT FALLS ASSESS-DOCD LE1/YR: CPT | Mod: CPTII,S$GLB,, | Performed by: NURSE PRACTITIONER

## 2024-06-13 PROCEDURE — G2211 COMPLEX E/M VISIT ADD ON: HCPCS | Mod: S$GLB,,, | Performed by: NURSE PRACTITIONER

## 2024-06-13 PROCEDURE — 1126F AMNT PAIN NOTED NONE PRSNT: CPT | Mod: CPTII,S$GLB,, | Performed by: NURSE PRACTITIONER

## 2024-06-13 PROCEDURE — 1159F MED LIST DOCD IN RCRD: CPT | Mod: CPTII,S$GLB,, | Performed by: NURSE PRACTITIONER

## 2024-06-13 PROCEDURE — 3074F SYST BP LT 130 MM HG: CPT | Mod: CPTII,S$GLB,, | Performed by: NURSE PRACTITIONER

## 2024-06-13 PROCEDURE — 3078F DIAST BP <80 MM HG: CPT | Mod: CPTII,S$GLB,, | Performed by: NURSE PRACTITIONER

## 2024-06-13 PROCEDURE — 99215 OFFICE O/P EST HI 40 MIN: CPT | Mod: S$GLB,,, | Performed by: NURSE PRACTITIONER

## 2024-06-13 PROCEDURE — 3288F FALL RISK ASSESSMENT DOCD: CPT | Mod: CPTII,S$GLB,, | Performed by: NURSE PRACTITIONER

## 2024-06-13 NOTE — PROGRESS NOTES
MEDICAL ONCOLOGY INITIAL CONSULTATION NOTE    Patient ID: Luanne Hernández is a 73 y.o. female.    Chief Complaint: Pancreatic adenocarcinoma    HPI: Patient is a 73 year female with no reported PMH who initially presented to [facility] with epigastric abdominal pain ultimately found to have pancreatic mass. MRI  performed and no metastatic lesions found, GI performed EUS. Has been evaluated by Hepato-biliary surgery at  and is consider borderline resectable with plan for dusty-adjuvant chemotherapy. She presents here to establish care close to her house.     Pathology: 3/1/24    Pancreatic head mass, fine needle aspiration (4 smears, 1 ThinPrep, 1   cell block):                                                            - Positive for malignant cells, consistent with ductal adenocarcinoma.       Imaging:     CT C/A/P : 24    Shows an ill-defined low-density mass in the region of the head and uncinate process of the pancreas suspicious for adenocarcinoma. CA 19-9 is over 42,000. There is also diffuse dilation of the pancreatic duct along with pancreatic pseudocyst formation   No evidence of metastatic disease in the chest.           Past Medical History:   Diagnosis Date    Pancreatic cancer      Family History   Problem Relation Name Age of Onset    Heart failure Mother      Hodgkin's lymphoma Father      Bladder Cancer Brother       Social History     Socioeconomic History    Marital status: Single   Tobacco Use    Smoking status: Former     Current packs/day: 0.00     Types: Cigarettes     Quit date:      Years since quittin.4    Smokeless tobacco: Former   Substance and Sexual Activity    Alcohol use: Not Currently    Drug use: Never     Social Determinants of Health     Financial Resource Strain: Low Risk  (2024)    Overall Financial Resource Strain (CARDIA)     Difficulty of Paying Living Expenses: Not hard at all   Food Insecurity: No Food Insecurity (2024)    Hunger Vital Sign      Worried About Running Out of Food in the Last Year: Never true     Ran Out of Food in the Last Year: Never true   Transportation Needs: No Transportation Needs (4/30/2024)    PRAPARE - Transportation     Lack of Transportation (Medical): No     Lack of Transportation (Non-Medical): No   Physical Activity: Insufficiently Active (4/30/2024)    Exercise Vital Sign     Days of Exercise per Week: 2 days     Minutes of Exercise per Session: 10 min   Stress: No Stress Concern Present (4/30/2024)    [employer] - Occupational Stress Questionnaire     Feeling of Stress : Not at all   Housing Stability: Unknown (2/25/2024)    Received from [facility], [employer] [facility]    Housing Stability Vital Sign     Unable to Pay for Housing in the Last Year: No     Number of Places Lived in the Last Year: 1     Past Surgical History:   Procedure Laterality Date    LASIK Bilateral          Review of systems:  Review of Systems   Constitutional:  Positive for activity change. Negative for appetite change, chills, diaphoresis, fatigue and unexpected weight change.   HENT:  Negative for congestion, facial swelling, hearing loss, mouth sores, trouble swallowing and voice change.    Eyes:  Negative for photophobia, pain, discharge and itching.   Respiratory:  Negative for apnea, cough, choking, chest tightness and shortness of breath.    Cardiovascular:  Negative for chest pain, palpitations and leg swelling.   Gastrointestinal:  Positive for abdominal pain. Negative for abdominal distention, anal bleeding and blood in stool.   Endocrine: Negative for cold intolerance, heat intolerance, polydipsia and polyphagia.   Genitourinary:  Negative for difficulty urinating, dysuria, flank pain and hematuria.   Musculoskeletal:  Negative for arthralgias, back pain, joint swelling,  myalgias, neck pain and neck stiffness.   Skin:  Positive for rash. Negative for color change, pallor and wound.   Allergic/Immunologic: Negative for environmental allergies, food allergies and immunocompromised state.   Neurological:  Negative for dizziness, seizures, facial asymmetry, speech difficulty, light-headedness, numbness and headaches.   Hematological:  Negative for adenopathy. Does not bruise/bleed easily.   Psychiatric/Behavioral:  Negative for agitation, behavioral problems, confusion, decreased concentration and sleep disturbance.            Physical Exam  Vitals and nursing note reviewed.   Constitutional:       General: She is not in acute distress.     Appearance: Normal appearance. She is not ill-appearing.   HENT:      Head: Normocephalic and atraumatic.      Nose: No congestion or rhinorrhea.   Eyes:      General: No scleral icterus.     Extraocular Movements: Extraocular movements intact.      Pupils: Pupils are equal, round, and reactive to light.   Cardiovascular:      Rate and Rhythm: Normal rate and regular rhythm.      Pulses: Normal pulses.      Heart sounds: Normal heart sounds. No murmur heard.     No gallop.   Pulmonary:      Effort: Pulmonary effort is normal. No respiratory distress.      Breath sounds: Normal breath sounds. No stridor. No wheezing or rhonchi.   Abdominal:      General: Bowel sounds are normal. There is no distension.      Palpations: There is no mass.      Tenderness: There is abdominal tenderness. There is no guarding.   Musculoskeletal:         General: No swelling, tenderness, deformity or signs of injury. Normal range of motion.      Cervical back: Normal range of motion and neck supple. No rigidity. No muscular tenderness.      Right lower leg: No edema.      Left lower leg: No edema.   Skin:     General: Skin is warm.      Coloration: Skin is not jaundiced or pale.      Findings: No bruising, lesion or rash.   Neurological:      General: No focal deficit  present.      Mental Status: She is alert and oriented to person, place, and time.      Cranial Nerves: No cranial nerve deficit.      Sensory: No sensory deficit.      Motor: No weakness.      Gait: Gait normal.   Psychiatric:         Mood and Affect: Mood normal.         Behavior: Behavior normal.         Thought Content: Thought content normal.     There were no vitals filed for this visit.      There is no height or weight on file to calculate BSA.    Assessment/Plan:      ECOG 1    Adenocarcinoma arising from pancreatic head and uncinate process:    == Borderline resectable.  No evidence of arterial involvement on CT scan.  No evidence of distant metastatic disease on CT imaging.   Has been evaluated by hepatobiliary surgery in [place] with plan for neoadjuvant chemotherapy.   == Discussed with her and family management guidelines and my recommendation for staging laparoscopy. Baseline  at 42 K at outside hospital  == I discussed with her the intended side effects of treatment giving her opportunity to ask questions.  After detailed discussion of intended side effects we agreed to start gemcitabine Abraxane secondary to her age and performance status.  Prior authorization request for gemcitabine plus Abraxane plus minus subsequent chemoradiation sent.    ==03/25/2024: Patient to clinic for chemotherapy education on Gemcitabine/Abraxane. She has some right flank pain and jaundice. Family with her reports juandice started yesterday.  Labs drawn 3.22/24 reviewed and bili 7.5, labs drawn 3 weeks prior bili was 0.4. Chemotherapy education not provided, pt needs evaluation may need stent placement.  Local ER not able to do MRCP/bili stents, called Dr. Gilmore's office in [place] and spoke to his nurse who reported he will not be back until 3/28/2024 with no coverage until then, called [place] where pt was initially diagnosed, no answer, left messages.  discussed with patient and Dr. Mazariegos, will send  referral to [facility] and patient will go to ER.  Pt will still need port placement for chemotherapy, lap for staging, at this time most acute matter is liver function.  ==04/01/2024:  Pt here today to discuss upcoming chemotherapy, side effect profile, risk versus benefits, and expected outcomes have been discussed today. All questions and concerns answered and consents have been signed. Pt taking oxycodone 10mg po q 4 hours per [facility].  Plan to start Gemcitabine/Abraxane as soon as port placed, pt has appt with Dr. Mcqueen today for eval for port placement.  Pt was seen at [facility] on 3/25/2024 and biliary stents placed, she has appt on 4/16/24 at [facility] for removal of plastic stents, metal to remain in place. Discussed with Dr. Mazariegos, plan to start chemo asap once port placed, will defer diagnostic lap initially recommended for staging  ==04/11/2024: Patient started chemotherapy on 4/4/2024, developed pruritic rash to abdomen starting day after chemotherapy which benadryl and calamine lotion has helped.  Likely secondary to Abraxane, will admin dexamethasone today and premedicate with dexamethasone po home medication with each treatment, and add pepcid and benadryl to premedications for subsequent chemotherapy.  No dyspnea, chest tightness, angioedema or other symptoms. Other treatment options would include FOLFIRINOX which previously has been discussed with Dr. Mazariegos and patient not likely to tolerate well. Will premedicate patient and continue current treatment.  She also had constipation which has now been relieved with increasing stool softener to 2 daily and adding prunes to diet.  ==04/18/2024: KUB reviewed and forwarded to Dr. Pedro at [facility]. Metal biliary stents in place, plastic stents not seen, may have passed. Patient to follow up with Dr. Pedro to confirm.  Premedication added to chemotherapy today with benadryl, dexamethasone and pepcid as well as home medication benadryl, pepcid and dexamethasone starting  day prior to treatment for 3 days.  Oxycodone refilled, labs reviewed, pt cleared for chemotherapy  ==05/02/2024: Tolerating chemotherapy well, patient did well with addition of premedications last cycle, did not develop rash with last cycle. KUB completed this week for eval of stents, forwarded to Dr. Pedro at [facility] to eval if she will have removed.  Labs reviewed, plan to continue chemotherapy, will obtain scans after 2 complete cycles of chemotherapy, needs to be ordered next appt  ==05/16/2024: Patient tolerating [place]/abraxane well. Reports improvement in appetite and energy.  Has not spoken to [facility] since last visit, she had cxr on 4/30/24 which was forwarded to Dr. Pedro. Encouraged pt to reach out to make sure she gets follow up. She received call from surgeon in [facility] from referral placed in March, current plan is neoadjuvant chemotherapy, she is on cycle 2, after 3 cycles will repeat scans.  Labs reviewed, pt is cleared for chemotherapy.    ==05/30/2024: ANC 2446, wbc 4.21, hgb 10.6, plt 225. Scans due after completion of cycle 3 (end of June). Pt presents for C3D1 of Gemcitabine/abraxane, tolerating well. Reports Dr. Pedro at [facility] has not received her KUB to eval stents, will have resent.  Appetite improved without megace, constipation improved, pain improved, will continue chemotherapy  ==06/13/2024: labs reviewed, anc 2494, wbc 4.67, plt 267, h/h 10.0/31.3.  Patient presents for Cycle 3 Day 15 of Gemcitabine/Abraxane with plans for repeat scans after 3 cycles.  Will order and have pt rtc with MD next appt for results as well as chemotherapy same day.  Pt was initially diagnosed at [facility] in [place] and staged as IB and was referred for diagnostic lap for staging, however, she then biliary obstruction with acute liver failure and had stents placed at [facility] (diagnostic lap was deferred, pt has not had surgical evaluation).  While at [facility] she had scans which also showed a renal mass which may represent  second primary, plans to work up further once pancreatic cancer is stable.    2. Pain Management:  == Continue with oxycodone prn  ==Refilled 4/18/24  ==Avoid acetaminophen     3. Renal Mass  ==Per ct at [facility]  ==Possible renal calculi per CT at [place] vs possible second primary, can work up once patient's pancreatic cancer is stable, current priority is to start chemotherapy as she was recently admitted for obstructive juandice secondary to her pancreatic cancer    4. Advanced Care Planning  ==Pt has power of , will bring in at next visit    5. Constipation  ==Continue current bowel regimen, stool softeners and prunes which is effective  ==opoid related    6. Rash  ==Resolved  ==Premedications added to chemotherapy: Dexamethasone, benadryl, pepcid    Plan:  Continue Gemcitabine/Abraxane q 2 weeks  PET scan for restaging  RTC 2 weeks with MD scan results and chemo same day (if unable to obtain pet in 2 weeks do not cancel appt, move to NP and keep on chemo schedule)  Patient's last scan was at [facility] - request disc from [facility] and send to [facility] for comparison when pet is done. She has not had any scans locally for comparison      Total time spent in counseling and discussion about further management options including relevant lab work, treatment,  prognosis, medications and intended side effects was more than 40 minutes. More than 50% of the time was spent on counseling and coordination of care.  This includes face to face time and non-face to face time preparing to see the patient (eg, review of tests), Obtaining and/or reviewing separately obtained history, Documenting clinical information in the electronic or other health record, Independently interpreting resultsand communicating results to the patient/family/caregiver, or Care coordination.

## 2024-06-25 DIAGNOSIS — G89.3 CANCER RELATED PAIN: Primary | ICD-10-CM

## 2024-06-25 RX ORDER — OXYCODONE HYDROCHLORIDE 10 MG/1
10 TABLET ORAL EVERY 6 HOURS PRN
Qty: 60 TABLET | Refills: 0 | Status: SHIPPED | OUTPATIENT
Start: 2024-06-25 | End: 2024-06-27 | Stop reason: SDUPTHER

## 2024-06-27 ENCOUNTER — OFFICE VISIT (OUTPATIENT)
Dept: HEMATOLOGY/ONCOLOGY | Facility: CLINIC | Age: 74
End: 2024-06-27
Payer: MEDICARE

## 2024-06-27 VITALS
RESPIRATION RATE: 16 BRPM | SYSTOLIC BLOOD PRESSURE: 108 MMHG | WEIGHT: 103.5 LBS | HEART RATE: 91 BPM | BODY MASS INDEX: 19.05 KG/M2 | DIASTOLIC BLOOD PRESSURE: 74 MMHG | HEIGHT: 62 IN

## 2024-06-27 DIAGNOSIS — T40.2X5A CONSTIPATION DUE TO OPIOID THERAPY: ICD-10-CM

## 2024-06-27 DIAGNOSIS — G89.3 CANCER RELATED PAIN: ICD-10-CM

## 2024-06-27 DIAGNOSIS — C25.0 MALIGNANT NEOPLASM OF HEAD OF PANCREAS: Primary | ICD-10-CM

## 2024-06-27 DIAGNOSIS — N28.89 RENAL MASS: ICD-10-CM

## 2024-06-27 DIAGNOSIS — K59.03 CONSTIPATION DUE TO OPIOID THERAPY: ICD-10-CM

## 2024-06-27 PROCEDURE — 1125F AMNT PAIN NOTED PAIN PRSNT: CPT | Mod: CPTII,S$GLB,, | Performed by: NURSE PRACTITIONER

## 2024-06-27 PROCEDURE — 3074F SYST BP LT 130 MM HG: CPT | Mod: CPTII,S$GLB,, | Performed by: NURSE PRACTITIONER

## 2024-06-27 PROCEDURE — 1101F PT FALLS ASSESS-DOCD LE1/YR: CPT | Mod: CPTII,S$GLB,, | Performed by: NURSE PRACTITIONER

## 2024-06-27 PROCEDURE — 3288F FALL RISK ASSESSMENT DOCD: CPT | Mod: CPTII,S$GLB,, | Performed by: NURSE PRACTITIONER

## 2024-06-27 PROCEDURE — 99215 OFFICE O/P EST HI 40 MIN: CPT | Mod: S$GLB,,, | Performed by: NURSE PRACTITIONER

## 2024-06-27 PROCEDURE — 3078F DIAST BP <80 MM HG: CPT | Mod: CPTII,S$GLB,, | Performed by: NURSE PRACTITIONER

## 2024-06-27 PROCEDURE — G2211 COMPLEX E/M VISIT ADD ON: HCPCS | Mod: S$GLB,,, | Performed by: NURSE PRACTITIONER

## 2024-06-27 PROCEDURE — 3008F BODY MASS INDEX DOCD: CPT | Mod: CPTII,S$GLB,, | Performed by: NURSE PRACTITIONER

## 2024-06-27 PROCEDURE — 1159F MED LIST DOCD IN RCRD: CPT | Mod: CPTII,S$GLB,, | Performed by: NURSE PRACTITIONER

## 2024-06-27 RX ORDER — SODIUM CHLORIDE 0.9 % (FLUSH) 0.9 %
10 SYRINGE (ML) INJECTION
OUTPATIENT
Start: 2024-07-11

## 2024-06-27 RX ORDER — ONDANSETRON HYDROCHLORIDE 2 MG/ML
8 INJECTION, SOLUTION INTRAVENOUS
OUTPATIENT
Start: 2024-06-27

## 2024-06-27 RX ORDER — OXYCODONE HYDROCHLORIDE 10 MG/1
10 TABLET ORAL EVERY 6 HOURS PRN
Qty: 60 TABLET | Refills: 0 | Status: SHIPPED | OUTPATIENT
Start: 2024-06-27

## 2024-06-27 RX ORDER — DIPHENHYDRAMINE HCL 25 MG
25 CAPSULE ORAL
Start: 2024-07-11

## 2024-06-27 RX ORDER — ONDANSETRON HYDROCHLORIDE 2 MG/ML
8 INJECTION, SOLUTION INTRAVENOUS
OUTPATIENT
Start: 2024-07-11

## 2024-06-27 RX ORDER — DIPHENHYDRAMINE HCL 25 MG
25 CAPSULE ORAL
Start: 2024-06-27

## 2024-06-27 RX ORDER — SODIUM CHLORIDE 0.9 % (FLUSH) 0.9 %
10 SYRINGE (ML) INJECTION
OUTPATIENT
Start: 2024-06-27

## 2024-06-27 RX ORDER — FAMOTIDINE 10 MG/ML
20 INJECTION INTRAVENOUS
Start: 2024-06-27

## 2024-06-27 RX ORDER — HEPARIN 100 UNIT/ML
500 SYRINGE INTRAVENOUS
OUTPATIENT
Start: 2024-07-11

## 2024-06-27 RX ORDER — HEPARIN 100 UNIT/ML
500 SYRINGE INTRAVENOUS
OUTPATIENT
Start: 2024-06-27

## 2024-06-27 RX ORDER — FAMOTIDINE 10 MG/ML
20 INJECTION INTRAVENOUS
Start: 2024-07-11

## 2024-06-27 NOTE — PROGRESS NOTES
MEDICAL ONCOLOGY INITIAL CONSULTATION NOTE    Patient ID: Luanne Hernández is a 73 y.o. female.    Chief Complaint: Pancreatic adenocarcinoma    HPI: Patient is a 73 year female with no reported PMH who initially presented to [facility] with epigastric abdominal pain ultimately found to have pancreatic mass. MRI  performed and no metastatic lesions found, GI performed EUS. Has been evaluated by Hepato-biliary surgery at  and is consider borderline resectable with plan for dusty-adjuvant chemotherapy. She presents here to establish care close to her house.     Pathology: 3/1/24    Pancreatic head mass, fine needle aspiration (4 smears, 1 ThinPrep, 1   cell block):                                                            - Positive for malignant cells, consistent with ductal adenocarcinoma.       Imaging:     CT C/A/P : 24    Shows an ill-defined low-density mass in the region of the head and uncinate process of the pancreas suspicious for adenocarcinoma. CA 19-9 is over 42,000. There is also diffuse dilation of the pancreatic duct along with pancreatic pseudocyst formation   No evidence of metastatic disease in the chest.           Past Medical History:   Diagnosis Date    Pancreatic cancer      Family History   Problem Relation Name Age of Onset    Heart failure Mother      Hodgkin's lymphoma Father      Bladder Cancer Brother       Social History     Socioeconomic History    Marital status: Single   Tobacco Use    Smoking status: Former     Current packs/day: 0.00     Types: Cigarettes     Quit date:      Years since quittin.4    Smokeless tobacco: Former   Substance and Sexual Activity    Alcohol use: Not Currently    Drug use: Never     Social Determinants of Health     Financial Resource Strain: Low Risk  (2024)    Overall Financial Resource Strain (CARDIA)     Difficulty of Paying Living Expenses: Not hard at all   Food Insecurity: No Food Insecurity (2024)    Hunger Vital Sign      Worried About Running Out of Food in the Last Year: Never true     Ran Out of Food in the Last Year: Never true   Transportation Needs: No Transportation Needs (4/30/2024)    PRAPARE - Transportation     Lack of Transportation (Medical): No     Lack of Transportation (Non-Medical): No   Physical Activity: Insufficiently Active (4/30/2024)    Exercise Vital Sign     Days of Exercise per Week: 2 days     Minutes of Exercise per Session: 10 min   Stress: No Stress Concern Present (4/30/2024)    [employer] [place] [employer] - Occupational Stress Questionnaire     Feeling of Stress : Not at all   Housing Stability: Unknown (2/25/2024)    Received from [facility], [employer] [facility]    Housing Stability Vital Sign     Unable to Pay for Housing in the Last Year: No     Number of Places Lived in the Last Year: 1     Past Surgical History:   Procedure Laterality Date    LASIK Bilateral          Review of systems:  Review of Systems   Constitutional:  Positive for activity change. Negative for appetite change, chills, diaphoresis, fatigue and unexpected weight change.   HENT:  Negative for congestion, facial swelling, hearing loss, mouth sores, trouble swallowing and voice change.    Eyes:  Negative for photophobia, pain, discharge and itching.   Respiratory:  Negative for apnea, cough, choking, chest tightness and shortness of breath.    Cardiovascular:  Negative for chest pain, palpitations and leg swelling.   Gastrointestinal:  Positive for abdominal pain. Negative for abdominal distention, anal bleeding and blood in stool.   Endocrine: Negative for cold intolerance, heat intolerance, polydipsia and polyphagia.   Genitourinary:  Negative for difficulty urinating, dysuria, flank pain and hematuria.   Musculoskeletal:  Negative for arthralgias, back pain, joint swelling,  myalgias, neck pain and neck stiffness.   Skin:  Positive for rash. Negative for color change, pallor and wound.   Allergic/Immunologic: Negative for environmental allergies, food allergies and immunocompromised state.   Neurological:  Negative for dizziness, seizures, facial asymmetry, speech difficulty, light-headedness, numbness and headaches.   Hematological:  Negative for adenopathy. Does not bruise/bleed easily.   Psychiatric/Behavioral:  Negative for agitation, behavioral problems, confusion, decreased concentration and sleep disturbance.            Physical Exam  Vitals and nursing note reviewed.   Constitutional:       General: She is not in acute distress.     Appearance: Normal appearance. She is not ill-appearing.   HENT:      Head: Normocephalic and atraumatic.      Nose: No congestion or rhinorrhea.   Eyes:      General: No scleral icterus.     Extraocular Movements: Extraocular movements intact.      Pupils: Pupils are equal, round, and reactive to light.   Cardiovascular:      Rate and Rhythm: Normal rate and regular rhythm.      Pulses: Normal pulses.      Heart sounds: Normal heart sounds. No murmur heard.     No gallop.   Pulmonary:      Effort: Pulmonary effort is normal. No respiratory distress.      Breath sounds: Normal breath sounds. No stridor. No wheezing or rhonchi.   Abdominal:      General: Bowel sounds are normal. There is no distension.      Palpations: There is no mass.      Tenderness: There is abdominal tenderness. There is no guarding.   Musculoskeletal:         General: No swelling, tenderness, deformity or signs of injury. Normal range of motion.      Cervical back: Normal range of motion and neck supple. No rigidity. No muscular tenderness.      Right lower leg: No edema.      Left lower leg: No edema.   Skin:     General: Skin is warm.      Coloration: Skin is not jaundiced or pale.      Findings: No bruising, lesion or rash.   Neurological:      General: No focal deficit  present.      Mental Status: She is alert and oriented to person, place, and time.      Cranial Nerves: No cranial nerve deficit.      Sensory: No sensory deficit.      Motor: No weakness.      Gait: Gait normal.   Psychiatric:         Mood and Affect: Mood normal.         Behavior: Behavior normal.         Thought Content: Thought content normal.     There were no vitals filed for this visit.      There is no height or weight on file to calculate BSA.    Assessment/Plan:      ECOG 1    Adenocarcinoma arising from pancreatic head and uncinate process:    == Borderline resectable.  No evidence of arterial involvement on CT scan.  No evidence of distant metastatic disease on CT imaging.   Has been evaluated by hepatobiliary surgery in [place] with plan for neoadjuvant chemotherapy.   == Discussed with her and family management guidelines and my recommendation for staging laparoscopy. Baseline  at 42 K at outside hospital  == I discussed with her the intended side effects of treatment giving her opportunity to ask questions.  After detailed discussion of intended side effects we agreed to start gemcitabine Abraxane secondary to her age and performance status.  Prior authorization request for gemcitabine plus Abraxane plus minus subsequent chemoradiation sent.    ==03/25/2024: Patient to clinic for chemotherapy education on Gemcitabine/Abraxane. She has some right flank pain and jaundice. Family with her reports juandice started yesterday.  Labs drawn 3.22/24 reviewed and bili 7.5, labs drawn 3 weeks prior bili was 0.4. Chemotherapy education not provided, pt needs evaluation may need stent placement.  Local ER not able to do MRCP/bili stents, called Dr. Gilmore's office in [place] and spoke to his nurse who reported he will not be back until 3/28/2024 with no coverage until then, called [place] where pt was initially diagnosed, no answer, left messages.  discussed with patient and Dr. Mazariegos, will send  referral to [facility] and patient will go to ER.  Pt will still need port placement for chemotherapy, lap for staging, at this time most acute matter is liver function.  ==04/01/2024:  Pt here today to discuss upcoming chemotherapy, side effect profile, risk versus benefits, and expected outcomes have been discussed today. All questions and concerns answered and consents have been signed. Pt taking oxycodone 10mg po q 4 hours per [facility].  Plan to start Gemcitabine/Abraxane as soon as port placed, pt has appt with Dr. Mcqueen today for eval for port placement.  Pt was seen at [facility] on 3/25/2024 and biliary stents placed, she has appt on 4/16/24 at [facility] for removal of plastic stents, metal to remain in place. Discussed with Dr. Mazariegos, plan to start chemo asap once port placed, will defer diagnostic lap initially recommended for staging  ==04/11/2024: Patient started chemotherapy on 4/4/2024, developed pruritic rash to abdomen starting day after chemotherapy which benadryl and calamine lotion has helped.  Likely secondary to Abraxane, will admin dexamethasone today and premedicate with dexamethasone po home medication with each treatment, and add pepcid and benadryl to premedications for subsequent chemotherapy.  No dyspnea, chest tightness, angioedema or other symptoms. Other treatment options would include FOLFIRINOX which previously has been discussed with Dr. Mazariegos and patient not likely to tolerate well. Will premedicate patient and continue current treatment.  She also had constipation which has now been relieved with increasing stool softener to 2 daily and adding prunes to diet.  ==04/18/2024: KUB reviewed and forwarded to Dr. Pedro at [facility]. Metal biliary stents in place, plastic stents not seen, may have passed. Patient to follow up with Dr. Pedro to confirm.  Premedication added to chemotherapy today with benadryl, dexamethasone and pepcid as well as home medication benadryl, pepcid and dexamethasone starting  day prior to treatment for 3 days.  Oxycodone refilled, labs reviewed, pt cleared for chemotherapy  ==05/02/2024: Tolerating chemotherapy well, patient did well with addition of premedications last cycle, did not develop rash with last cycle. KUB completed this week for eval of stents, forwarded to Dr. Pedro at [facility] to eval if she will have removed.  Labs reviewed, plan to continue chemotherapy, will obtain scans after 2 complete cycles of chemotherapy, needs to be ordered next appt  ==05/16/2024: Patient tolerating Harvey/abraxane well. Reports improvement in appetite and energy.  Has not spoken to [facility] since last visit, she had cxr on 4/30/24 which was forwarded to Dr. Pedro. Encouraged pt to reach out to make sure she gets follow up. She received call from surgeon in [facility] from referral placed in March, current plan is neoadjuvant chemotherapy, she is on cycle 2, after 3 cycles will repeat scans.  Labs reviewed, pt is cleared for chemotherapy.    ==05/30/2024: ANC 2446, wbc 4.21, hgb 10.6, plt 225. Scans due after completion of cycle 3 (end of June). Pt presents for C3D1 of Gemcitabine/abraxane, tolerating well. Reports Dr. Pedro at [facility] has not received her KUB to eval stents, will have resent.  Appetite improved without megace, constipation improved, pain improved, will continue chemotherapy  ==06/13/2024: labs reviewed, anc 2494, wbc 4.67, plt 267, h/h 10.0/31.3.  Patient presents for Cycle 3 Day 15 of Gemcitabine/Abraxane with plans for repeat scans after 3 cycles.  Will order and have pt rtc with MD next appt for results as well as chemotherapy same day.  Pt was initially diagnosed at [facility] in [place] and staged as IB and was referred for diagnostic lap for staging, however, she then biliary obstruction with acute liver failure and had stents placed at [facility] (diagnostic lap was deferred, pt has not had surgical evaluation).  While at [facility] she had scans which also showed a renal mass which may represent  second primary, plans to work up further once pancreatic cancer is stable.  ==06/27/2024: Pt presents for Day 1 of Cycle 4 gemcitabine/abraxane.  She is scheduled for her PET scan on 07/03/2024. Ca 19-9 drawn 6/12/24 was 4724 u/ml.  Labs drawn 6/26/24: Wbc 4.39, h/h 10.3/33.1, plt 230k, anc 1936    2. Pain Management:  == Continue with oxycodone prn  ==Refilled 4/18/24  ==Avoid acetaminophen     3. Renal Mass  ==Per ct at [facility]  ==Possible renal calculi per CT at [place] vs possible second primary, can work up once patient's pancreatic cancer is stable, current priority is to start chemotherapy as she was recently admitted for obstructive juandice secondary to her pancreatic cancer    4. Advanced Care Planning  ==Pt has power of , will bring in at next visit    5. Constipation  ==Continue current bowel regimen, stool softeners and prunes which is effective  ==opoid related    6. Rash  ==Resolved  ==Premedications added to chemotherapy: Dexamethasone, benadryl, pepcid    Plan:  Continue Gemcitabine/Abraxane q 2 weeks  PET scan for restaging - scheduled on 7/03/2024  RTC 2 weeks with MD scan results and chemo same day (she will be due for cycle 4 day 15 on 7/11/2024)  Patient's last scan was at [facility] - request disc from [facility] and send to [facility] for comparison when pet is done. She has not had any scans locally for comparison      Total time spent in counseling and discussion about further management options including relevant lab work, treatment,  prognosis, medications and intended side effects was more than 40 minutes. More than 50% of the time was spent on counseling and coordination of care.  This includes face to face time and non-face to face time preparing to see the patient (eg, review of tests), Obtaining and/or reviewing separately obtained history, Documenting clinical information in the electronic or other health record, Independently interpreting resultsand communicating  results to the patient/family/caregiver, or Care coordination.

## 2024-07-10 ENCOUNTER — TELEPHONE (OUTPATIENT)
Dept: HEMATOLOGY/ONCOLOGY | Facility: CLINIC | Age: 74
End: 2024-07-10
Payer: MEDICARE

## 2024-07-11 ENCOUNTER — OFFICE VISIT (OUTPATIENT)
Dept: HEMATOLOGY/ONCOLOGY | Facility: CLINIC | Age: 74
End: 2024-07-11
Payer: MEDICARE

## 2024-07-11 VITALS
BODY MASS INDEX: 18.43 KG/M2 | RESPIRATION RATE: 16 BRPM | TEMPERATURE: 98 F | DIASTOLIC BLOOD PRESSURE: 90 MMHG | SYSTOLIC BLOOD PRESSURE: 132 MMHG | HEART RATE: 116 BPM | WEIGHT: 100.13 LBS | HEIGHT: 62 IN

## 2024-07-11 DIAGNOSIS — C78.7 METASTASIS TO LIVER: Primary | ICD-10-CM

## 2024-07-11 DIAGNOSIS — C25.9 PANCREATIC ADENOCARCINOMA: ICD-10-CM

## 2024-07-11 PROCEDURE — 99215 OFFICE O/P EST HI 40 MIN: CPT | Mod: S$GLB,,, | Performed by: NURSE PRACTITIONER

## 2024-07-11 PROCEDURE — 3075F SYST BP GE 130 - 139MM HG: CPT | Mod: CPTII,S$GLB,, | Performed by: NURSE PRACTITIONER

## 2024-07-11 PROCEDURE — 3008F BODY MASS INDEX DOCD: CPT | Mod: CPTII,S$GLB,, | Performed by: NURSE PRACTITIONER

## 2024-07-11 PROCEDURE — 1159F MED LIST DOCD IN RCRD: CPT | Mod: CPTII,S$GLB,, | Performed by: NURSE PRACTITIONER

## 2024-07-11 PROCEDURE — 1126F AMNT PAIN NOTED NONE PRSNT: CPT | Mod: CPTII,S$GLB,, | Performed by: NURSE PRACTITIONER

## 2024-07-11 PROCEDURE — 3288F FALL RISK ASSESSMENT DOCD: CPT | Mod: CPTII,S$GLB,, | Performed by: NURSE PRACTITIONER

## 2024-07-11 PROCEDURE — 3080F DIAST BP >= 90 MM HG: CPT | Mod: CPTII,S$GLB,, | Performed by: NURSE PRACTITIONER

## 2024-07-11 PROCEDURE — 1101F PT FALLS ASSESS-DOCD LE1/YR: CPT | Mod: CPTII,S$GLB,, | Performed by: NURSE PRACTITIONER

## 2024-07-11 RX ORDER — DEXAMETHASONE 4 MG/1
TABLET ORAL
Qty: 30 TABLET | Refills: 2 | Status: SHIPPED | OUTPATIENT
Start: 2024-07-11

## 2024-07-11 NOTE — PROGRESS NOTES
MEDICAL ONCOLOGY INITIAL CONSULTATION NOTE    Patient ID: Luanne Hernández is a 73 y.o. female.    Chief Complaint: Pancreatic adenocarcinoma    HPI: Patient is a 73 year female with no reported PMH who initially presented to [facility] with epigastric abdominal pain ultimately found to have pancreatic mass. MRI  performed and no metastatic lesions found, GI performed EUS. Has been evaluated by Hepato-biliary surgery at  and is consider borderline resectable with plan for dusty-adjuvant chemotherapy. She presents here to establish care close to her house.     Pathology: 3/1/24    Pancreatic head mass, fine needle aspiration (4 smears, 1 ThinPrep, 1   cell block):                                                            - Positive for malignant cells, consistent with ductal adenocarcinoma.       Imaging:     CT C/A/P : 24    Shows an ill-defined low-density mass in the region of the head and uncinate process of the pancreas suspicious for adenocarcinoma. CA 19-9 is over 42,000. There is also diffuse dilation of the pancreatic duct along with pancreatic pseudocyst formation   No evidence of metastatic disease in the chest.     PET 7/3/2024:                 Past Medical History:   Diagnosis Date    Pancreatic cancer      Family History   Problem Relation Name Age of Onset    Heart failure Mother      Hodgkin's lymphoma Father      Bladder Cancer Brother       Social History     Socioeconomic History    Marital status: Single   Tobacco Use    Smoking status: Former     Current packs/day: 0.00     Types: Cigarettes     Quit date:      Years since quittin.5    Smokeless tobacco: Former   Substance and Sexual Activity    Alcohol use: Not Currently    Drug use: Never     Social Determinants of Health     Financial Resource Strain: Low Risk  (2024)    Overall Financial Resource Strain (CARDIA)     Difficulty of Paying Living Expenses: Not hard at all   Food Insecurity: No Food Insecurity (2024)     Hunger Vital Sign     Worried About Running Out of Food in the Last Year: Never true     Ran Out of Food in the Last Year: Never true   Transportation Needs: No Transportation Needs (4/30/2024)    PRAPARE - Transportation     Lack of Transportation (Medical): No     Lack of Transportation (Non-Medical): No   Physical Activity: Insufficiently Active (4/30/2024)    Exercise Vital Sign     Days of Exercise per Week: 2 days     Minutes of Exercise per Session: 10 min   Stress: No Stress Concern Present (4/30/2024)    [employer] [place] [employer] - Occupational Stress Questionnaire     Feeling of Stress : Not at all   Housing Stability: Unknown (2/25/2024)    Received from [facility], [employer] [facility]    Housing Stability Vital Sign     Unable to Pay for Housing in the Last Year: No     Number of Places Lived in the Last Year: 1     Past Surgical History:   Procedure Laterality Date    LASIK Bilateral          Review of systems:  Review of Systems   Constitutional:  Positive for activity change. Negative for appetite change, chills, diaphoresis, fatigue and unexpected weight change.   HENT:  Negative for congestion, facial swelling, hearing loss, mouth sores, trouble swallowing and voice change.    Eyes:  Negative for photophobia, pain, discharge and itching.   Respiratory:  Negative for apnea, cough, choking, chest tightness and shortness of breath.    Cardiovascular:  Negative for chest pain, palpitations and leg swelling.   Gastrointestinal:  Positive for abdominal pain. Negative for abdominal distention, anal bleeding and blood in stool.   Endocrine: Negative for cold intolerance, heat intolerance, polydipsia and polyphagia.   Genitourinary:  Negative for difficulty urinating, dysuria, flank pain and hematuria.   Musculoskeletal:  Negative for arthralgias, back  pain, joint swelling, myalgias, neck pain and neck stiffness.   Skin:  Positive for rash. Negative for color change, pallor and wound.   Allergic/Immunologic: Negative for environmental allergies, food allergies and immunocompromised state.   Neurological:  Negative for dizziness, seizures, facial asymmetry, speech difficulty, light-headedness, numbness and headaches.   Hematological:  Negative for adenopathy. Does not bruise/bleed easily.   Psychiatric/Behavioral:  Negative for agitation, behavioral problems, confusion, decreased concentration and sleep disturbance.            Physical Exam  Vitals and nursing note reviewed.   Constitutional:       General: She is not in acute distress.     Appearance: Normal appearance. She is not ill-appearing.   HENT:      Head: Normocephalic and atraumatic.      Nose: No congestion or rhinorrhea.   Eyes:      General: No scleral icterus.     Extraocular Movements: Extraocular movements intact.      Pupils: Pupils are equal, round, and reactive to light.   Cardiovascular:      Rate and Rhythm: Normal rate and regular rhythm.      Pulses: Normal pulses.      Heart sounds: Normal heart sounds. No murmur heard.     No gallop.   Pulmonary:      Effort: Pulmonary effort is normal. No respiratory distress.      Breath sounds: Normal breath sounds. No stridor. No wheezing or rhonchi.   Abdominal:      General: Bowel sounds are normal. There is no distension.      Palpations: There is no mass.      Tenderness: There is abdominal tenderness. There is no guarding.   Musculoskeletal:         General: No swelling, tenderness, deformity or signs of injury. Normal range of motion.      Cervical back: Normal range of motion and neck supple. No rigidity. No muscular tenderness.      Right lower leg: No edema.      Left lower leg: No edema.   Skin:     General: Skin is warm.      Coloration: Skin is not jaundiced or pale.      Findings: No bruising, lesion or rash.   Neurological:      General:  No focal deficit present.      Mental Status: She is alert and oriented to person, place, and time.      Cranial Nerves: No cranial nerve deficit.      Sensory: No sensory deficit.      Motor: No weakness.      Gait: Gait normal.   Psychiatric:         Mood and Affect: Mood normal.         Behavior: Behavior normal.         Thought Content: Thought content normal.       Vitals:    07/11/24 0823   BP: (!) 132/90   Pulse: (!) 116   Resp: 16   Temp: 98.1 °F (36.7 °C)         Body surface area is 1.41 meters squared.    Assessment/Plan:      ECOG 1    Adenocarcinoma arising from pancreatic head and uncinate process:    == Borderline resectable.  No evidence of arterial involvement on CT scan.  No evidence of distant metastatic disease on CT imaging.   Has been evaluated by hepatobiliary surgery in [place] with plan for neoadjuvant chemotherapy.   == Discussed with her and family management guidelines and my recommendation for staging laparoscopy. Baseline  at 42 K at outside hospital  == I discussed with her the intended side effects of treatment giving her opportunity to ask questions.  After detailed discussion of intended side effects we agreed to start gemcitabine Abraxane secondary to her age and performance status.  Prior authorization request for gemcitabine plus Abraxane plus minus subsequent chemoradiation sent.    ==03/25/2024: Patient to clinic for chemotherapy education on Gemcitabine/Abraxane. She has some right flank pain and jaundice. Family with her reports juandice started yesterday.  Labs drawn 3.22/24 reviewed and bili 7.5, labs drawn 3 weeks prior bili was 0.4. Chemotherapy education not provided, pt needs evaluation may need stent placement.  Local ER not able to do MRCP/bili stents, called Dr. Gilmore's office in [place] and spoke to his nurse who reported he will not be back until 3/28/2024 with no coverage until then, called [place] where pt was initially diagnosed, no answer, left  messages.  discussed with patient and Dr. Mazariegos, will send referral to [facility] and patient will go to ER.  Pt will still need port placement for chemotherapy, lap for staging, at this time most acute matter is liver function.  ==04/01/2024:  Pt here today to discuss upcoming chemotherapy, side effect profile, risk versus benefits, and expected outcomes have been discussed today. All questions and concerns answered and consents have been signed. Pt taking oxycodone 10mg po q 4 hours per [facility].  Plan to start Gemcitabine/Abraxane as soon as port placed, pt has appt with Dr. Mcqueen today for eval for port placement.  Pt was seen at [facility] on 3/25/2024 and biliary stents placed, she has appt on 4/16/24 at [facility] for removal of plastic stents, metal to remain in place. Discussed with Dr. Mazariegos, plan to start chemo asap once port placed, will defer diagnostic lap initially recommended for staging  ==04/11/2024: Patient started chemotherapy on 4/4/2024, developed pruritic rash to abdomen starting day after chemotherapy which benadryl and calamine lotion has helped.  Likely secondary to Abraxane, will admin dexamethasone today and premedicate with dexamethasone po home medication with each treatment, and add pepcid and benadryl to premedications for subsequent chemotherapy.  No dyspnea, chest tightness, angioedema or other symptoms. Other treatment options would include FOLFIRINOX which previously has been discussed with Dr. Mazariegos and patient not likely to tolerate well. Will premedicate patient and continue current treatment.  She also had constipation which has now been relieved with increasing stool softener to 2 daily and adding prunes to diet.  ==04/18/2024: KUB reviewed and forwarded to Dr. Pedro at [facility]. Metal biliary stents in place, plastic stents not seen, may have passed. Patient to follow up with Dr. Pedro to confirm.  Premedication added to chemotherapy today with benadryl, dexamethasone and pepcid as well as  home medication benadryl, pepcid and dexamethasone starting day prior to treatment for 3 days.  Oxycodone refilled, labs reviewed, pt cleared for chemotherapy  ==05/02/2024: Tolerating chemotherapy well, patient did well with addition of premedications last cycle, did not develop rash with last cycle. KUB completed this week for eval of stents, forwarded to Dr. Pedro at [facility] to eval if she will have removed.  Labs reviewed, plan to continue chemotherapy, will obtain scans after 2 complete cycles of chemotherapy, needs to be ordered next appt  ==05/16/2024: Patient tolerating [place]/abraxane well. Reports improvement in appetite and energy.  Has not spoken to [facility] since last visit, she had cxr on 4/30/24 which was forwarded to Dr. Pedro. Encouraged pt to reach out to make sure she gets follow up. She received call from surgeon in [facility] from referral placed in March, current plan is neoadjuvant chemotherapy, she is on cycle 2, after 3 cycles will repeat scans.  Labs reviewed, pt is cleared for chemotherapy.    ==05/30/2024: ANC 2446, wbc 4.21, hgb 10.6, plt 225. Scans due after completion of cycle 3 (end of June). Pt presents for C3D1 of Gemcitabine/abraxane, tolerating well. Reports Dr. Pedro at [facility] has not received her KUB to eval stents, will have resent.  Appetite improved without megace, constipation improved, pain improved, will continue chemotherapy  ==06/13/2024: labs reviewed, anc 2494, wbc 4.67, plt 267, h/h 10.0/31.3.  Patient presents for Cycle 3 Day 15 of Gemcitabine/Abraxane with plans for repeat scans after 3 cycles.  Will order and have pt rtc with MD next appt for results as well as chemotherapy same day.  Pt was initially diagnosed at [facility] in [place] and staged as IB and was referred for diagnostic lap for staging, however, she then biliary obstruction with acute liver failure and had stents placed at [facility] (diagnostic lap was deferred, pt has not had surgical evaluation).  While at [facility] she  had scans which also showed a renal mass which may represent second primary, plans to work up further once pancreatic cancer is stable.  ==06/27/2024: Pt presents for Day 1 of Cycle 4 gemcitabine/abraxane.  She is scheduled for her PET scan on 07/03/2024. Ca 19-9 drawn 6/12/24 was 4724 u/ml.  Labs drawn 6/26/24: Wbc 4.39, h/h 10.3/33.1, plt 230k, anc 1936  == 7/11/24: review of PET scan with patient and family showing stable disease. Will plan to continue with current chemotherapy and plan to rescan with PET in 2 months. Genetic and genomic testing ordered today.    2. Pain Management:  == Continue with oxycodone prn  ==Refilled 4/18/24  ==Avoid acetaminophen     3. Renal Mass  ==Per ct at [facility]  ==Possible renal calculi per CT at [place] vs possible second primary, can work up once patient's pancreatic cancer is stable, current priority is to start chemotherapy as she was recently admitted for obstructive juandice secondary to her pancreatic cancer    4. Advanced Care Planning  ==Pt has power of , will bring in at next visit    5. Constipation  ==Continue current bowel regimen, stool softeners and prunes which is effective  ==opoid related    6. Rash  ==Resolved  ==Premedications added to chemotherapy: Dexamethasone, benadryl, pepcid    7. Genetic and Genomic testing ordered -tissue and blood     Plan:  Continue Gemcitabine/Abraxane q 2 weeks    RTC 2 weeks with MD   Labs with home health as planned   Julia genetic testing ordered  Haresh tissue and blood ordered       Total time spent in counseling and discussion about further management options including relevant lab work, treatment,  prognosis, medications and intended side effects was more than 40 minutes. More than 50% of the time was spent on counseling and coordination of care.  This includes face to face time and non-face to face time preparing to see the patient (eg, review of tests), Obtaining and/or reviewing separately obtained  history, Documenting clinical information in the electronic or other health record, Independently interpreting resultsand communicating results to the patient/family/caregiver, or Care coordination.

## 2024-07-24 ENCOUNTER — DOCUMENTATION ONLY (OUTPATIENT)
Dept: HEMATOLOGY/ONCOLOGY | Facility: CLINIC | Age: 74
End: 2024-07-24
Payer: MEDICARE

## 2024-07-24 NOTE — PROGRESS NOTES
Called [facility] lab @ [phone] and asked them to fax the lab results to our office.

## 2024-07-25 ENCOUNTER — OFFICE VISIT (OUTPATIENT)
Dept: HEMATOLOGY/ONCOLOGY | Facility: CLINIC | Age: 74
End: 2024-07-25
Payer: MEDICARE

## 2024-07-25 VITALS
TEMPERATURE: 98 F | HEIGHT: 62 IN | SYSTOLIC BLOOD PRESSURE: 120 MMHG | WEIGHT: 97 LBS | RESPIRATION RATE: 16 BRPM | HEART RATE: 116 BPM | OXYGEN SATURATION: 96 % | DIASTOLIC BLOOD PRESSURE: 78 MMHG | BODY MASS INDEX: 17.85 KG/M2

## 2024-07-25 DIAGNOSIS — G89.3 CANCER RELATED PAIN: ICD-10-CM

## 2024-07-25 DIAGNOSIS — C25.9 PANCREATIC ADENOCARCINOMA: Primary | ICD-10-CM

## 2024-07-25 DIAGNOSIS — C25.9 PANCREATIC ADENOCARCINOMA: ICD-10-CM

## 2024-07-25 RX ORDER — OXYCODONE HYDROCHLORIDE 10 MG/1
10 TABLET ORAL EVERY 6 HOURS PRN
Qty: 60 TABLET | Refills: 0 | Status: SHIPPED | OUTPATIENT
Start: 2024-07-25

## 2024-07-25 RX ORDER — DEXAMETHASONE 4 MG/1
TABLET ORAL
Qty: 30 TABLET | Refills: 2 | Status: SHIPPED | OUTPATIENT
Start: 2024-07-25

## 2024-07-25 RX ORDER — ONDANSETRON HYDROCHLORIDE 2 MG/ML
8 INJECTION, SOLUTION INTRAVENOUS
OUTPATIENT
Start: 2024-07-25

## 2024-07-25 RX ORDER — SODIUM CHLORIDE 0.9 % (FLUSH) 0.9 %
10 SYRINGE (ML) INJECTION
OUTPATIENT
Start: 2024-08-08

## 2024-07-25 RX ORDER — DIPHENHYDRAMINE HCL 25 MG
25 CAPSULE ORAL
Start: 2024-07-25

## 2024-07-25 RX ORDER — ONDANSETRON HYDROCHLORIDE 2 MG/ML
8 INJECTION, SOLUTION INTRAVENOUS
OUTPATIENT
Start: 2024-08-08

## 2024-07-25 RX ORDER — HEPARIN 100 UNIT/ML
500 SYRINGE INTRAVENOUS
OUTPATIENT
Start: 2024-08-08

## 2024-07-25 RX ORDER — DIPHENHYDRAMINE HCL 25 MG
25 CAPSULE ORAL
Start: 2024-08-08

## 2024-07-25 RX ORDER — SODIUM CHLORIDE 0.9 % (FLUSH) 0.9 %
10 SYRINGE (ML) INJECTION
OUTPATIENT
Start: 2024-07-25

## 2024-07-25 RX ORDER — FAMOTIDINE 10 MG/ML
20 INJECTION INTRAVENOUS
Start: 2024-07-25

## 2024-07-25 RX ORDER — HEPARIN 100 UNIT/ML
500 SYRINGE INTRAVENOUS
OUTPATIENT
Start: 2024-07-25

## 2024-07-25 RX ORDER — FAMOTIDINE 10 MG/ML
20 INJECTION INTRAVENOUS
Start: 2024-08-08

## 2024-07-25 NOTE — PROGRESS NOTES
MEDICAL ONCOLOGY FOLLOW UP CONSULTATION NOTE    Patient ID: Luanne Hernández is a 73 y.o. female.    Chief Complaint: Pancreatic adenocarcinoma    HPI: Patient is a 73 year female with no reported PMH who initially presented to [facility] with epigastric abdominal pain ultimately found to have pancreatic mass. MRI  performed and no metastatic lesions found, GI performed EUS. Has been evaluated by Hepato-biliary surgery at  and is consider borderline resectable with plan for dusty-adjuvant chemotherapy. She presents here to establish care close to her house.     Pathology: 3/1/24    Pancreatic head mass, fine needle aspiration (4 smears, 1 ThinPrep, 1   cell block):                                                            - Positive for malignant cells, consistent with ductal adenocarcinoma.       Imaging:     CT C/A/P : 24    Shows an ill-defined low-density mass in the region of the head and uncinate process of the pancreas suspicious for adenocarcinoma. CA 19-9 is over 42,000. There is also diffuse dilation of the pancreatic duct along with pancreatic pseudocyst formation   No evidence of metastatic disease in the chest.     PET 7/3/2024:                 Past Medical History:   Diagnosis Date    Pancreatic cancer      Family History   Problem Relation Name Age of Onset    Heart failure Mother      Hodgkin's lymphoma Father      Bladder Cancer Brother       Social History     Socioeconomic History    Marital status: Single   Tobacco Use    Smoking status: Former     Current packs/day: 0.00     Types: Cigarettes     Quit date:      Years since quittin.5    Smokeless tobacco: Former   Substance and Sexual Activity    Alcohol use: Not Currently    Drug use: Never     Social Determinants of Health     Financial Resource Strain: Low Risk  (2024)    Overall Financial Resource Strain (CARDIA)     Difficulty of Paying Living Expenses: Not hard at all   Food Insecurity: No Food Insecurity (2024)     Hunger Vital Sign     Worried About Running Out of Food in the Last Year: Never true     Ran Out of Food in the Last Year: Never true   Transportation Needs: No Transportation Needs (4/30/2024)    PRAPARE - Transportation     Lack of Transportation (Medical): No     Lack of Transportation (Non-Medical): No   Physical Activity: Insufficiently Active (4/30/2024)    Exercise Vital Sign     Days of Exercise per Week: 2 days     Minutes of Exercise per Session: 10 min   Stress: No Stress Concern Present (4/30/2024)    [employer] [place] [employer] - Occupational Stress Questionnaire     Feeling of Stress : Not at all   Housing Stability: Unknown (2/25/2024)    Received from [facility], [employer] [facility]    Housing Stability Vital Sign     Unable to Pay for Housing in the Last Year: No     Number of Places Lived in the Last Year: 1     Past Surgical History:   Procedure Laterality Date    LASIK Bilateral          Review of systems:  Review of Systems   Constitutional:  Positive for activity change. Negative for appetite change, chills, diaphoresis, fatigue and unexpected weight change.   HENT:  Negative for congestion, facial swelling, hearing loss, mouth sores, trouble swallowing and voice change.    Eyes:  Negative for photophobia, pain, discharge and itching.   Respiratory:  Negative for apnea, cough, choking, chest tightness and shortness of breath.    Cardiovascular:  Negative for chest pain, palpitations and leg swelling.   Gastrointestinal:  Positive for abdominal pain. Negative for abdominal distention, anal bleeding and blood in stool.   Endocrine: Negative for cold intolerance, heat intolerance, polydipsia and polyphagia.   Genitourinary:  Negative for difficulty urinating, dysuria, flank pain and hematuria.   Musculoskeletal:  Negative for arthralgias, back  pain, joint swelling, myalgias, neck pain and neck stiffness.   Skin:  Positive for rash. Negative for color change, pallor and wound.   Allergic/Immunologic: Negative for environmental allergies, food allergies and immunocompromised state.   Neurological:  Negative for dizziness, seizures, facial asymmetry, speech difficulty, light-headedness, numbness and headaches.   Hematological:  Negative for adenopathy. Does not bruise/bleed easily.   Psychiatric/Behavioral:  Negative for agitation, behavioral problems, confusion, decreased concentration and sleep disturbance.            Physical Exam  Vitals and nursing note reviewed.   Constitutional:       General: She is not in acute distress.     Appearance: Normal appearance. She is not ill-appearing.   HENT:      Head: Normocephalic and atraumatic.      Nose: No congestion or rhinorrhea.   Eyes:      General: No scleral icterus.     Extraocular Movements: Extraocular movements intact.      Pupils: Pupils are equal, round, and reactive to light.   Cardiovascular:      Rate and Rhythm: Normal rate and regular rhythm.      Pulses: Normal pulses.      Heart sounds: Normal heart sounds. No murmur heard.     No gallop.   Pulmonary:      Effort: Pulmonary effort is normal. No respiratory distress.      Breath sounds: Normal breath sounds. No stridor. No wheezing or rhonchi.   Abdominal:      General: Bowel sounds are normal. There is no distension.      Palpations: There is no mass.      Tenderness: There is abdominal tenderness. There is no guarding.   Musculoskeletal:         General: No swelling, tenderness, deformity or signs of injury. Normal range of motion.      Cervical back: Normal range of motion and neck supple. No rigidity. No muscular tenderness.      Right lower leg: No edema.      Left lower leg: No edema.   Skin:     General: Skin is warm.      Coloration: Skin is not jaundiced or pale.      Findings: No bruising, lesion or rash.   Neurological:      General:  No focal deficit present.      Mental Status: She is alert and oriented to person, place, and time.      Cranial Nerves: No cranial nerve deficit.      Sensory: No sensory deficit.      Motor: No weakness.      Gait: Gait normal.   Psychiatric:         Mood and Affect: Mood normal.         Behavior: Behavior normal.         Thought Content: Thought content normal.       Vitals:    07/25/24 0815   BP: 120/78   Pulse: (!) 116   Resp: 16   Temp: 97.8 °F (36.6 °C)         Body surface area is 1.39 meters squared.    Assessment/Plan:      ECOG 1    Adenocarcinoma arising from pancreatic head and uncinate process:    == Borderline resectable.  No evidence of arterial involvement on CT scan.  No evidence of distant metastatic disease on CT imaging.   Has been evaluated by hepatobiliary surgery in [place] with plan for neoadjuvant chemotherapy.   == Discussed with her and family management guidelines and my recommendation for staging laparoscopy. Baseline  at 42 K at outside hospital  == I discussed with her the intended side effects of treatment giving her opportunity to ask questions.  After detailed discussion of intended side effects we agreed to start gemcitabine Abraxane secondary to her age and performance status.  Prior authorization request for gemcitabine plus Abraxane plus minus subsequent chemoradiation sent.    ==03/25/2024: Patient to clinic for chemotherapy education on Gemcitabine/Abraxane. She has some right flank pain and jaundice. Family with her reports donell started yesterday.  Labs drawn 3.22/24 reviewed and bili 7.5, labs drawn 3 weeks prior bili was 0.4. Chemotherapy education not provided, pt needs evaluation may need stent placement.  Local ER not able to do MRCP/bili stents, called Dr. Gilmore's office in [place] and spoke to his nurse who reported he will not be back until 3/28/2024 with no coverage until then, called [facility] pt was initially diagnosed, no answer, left  messages.  discussed with patient and Dr. Mazariegos, will send referral to [facility] and patient will go to ER.  Pt will still need port placement for chemotherapy, lap for staging, at this time most acute matter is liver function.  ==04/01/2024:  Pt here today to discuss upcoming chemotherapy, side effect profile, risk versus benefits, and expected outcomes have been discussed today. All questions and concerns answered and consents have been signed. Pt taking oxycodone 10mg po q 4 hours per [facility].  Plan to start Gemcitabine/Abraxane as soon as port placed, pt has appt with Dr. Mcqueen today for eval for port placement.  Pt was seen at [facility] on 3/25/2024 and biliary stents placed, she has appt on 4/16/24 at [facility] for removal of plastic stents, metal to remain in place. Discussed with Dr. Mazariegos, plan to start chemo asap once port placed, will defer diagnostic lap initially recommended for staging  ==04/11/2024: Patient started chemotherapy on 4/4/2024, developed pruritic rash to abdomen starting day after chemotherapy which benadryl and calamine lotion has helped.  Likely secondary to Abraxane, will admin dexamethasone today and premedicate with dexamethasone po home medication with each treatment, and add pepcid and benadryl to premedications for subsequent chemotherapy.  No dyspnea, chest tightness, angioedema or other symptoms. Other treatment options would include FOLFIRINOX which previously has been discussed with Dr. Mazariegos and patient not likely to tolerate well. Will premedicate patient and continue current treatment.  She also had constipation which has now been relieved with increasing stool softener to 2 daily and adding prunes to diet.  ==04/18/2024: KUB reviewed and forwarded to Dr. Pedor at [facility]. Metal biliary stents in place, plastic stents not seen, may have passed. Patient to follow up with Dr. Pedro to confirm.  Premedication added to chemotherapy today with benadryl, dexamethasone and pepcid as well as  home medication benadryl, pepcid and dexamethasone starting day prior to treatment for 3 days.  Oxycodone refilled, labs reviewed, pt cleared for chemotherapy  ==05/02/2024: Tolerating chemotherapy well, patient did well with addition of premedications last cycle, did not develop rash with last cycle. KUB completed this week for eval of stents, forwarded to Dr. Pedro at [facility] to eval if she will have removed.  Labs reviewed, plan to continue chemotherapy, will obtain scans after 2 complete cycles of chemotherapy, needs to be ordered next appt  ==05/16/2024: Patient tolerating [place]/abraxane well. Reports improvement in appetite and energy.  Has not spoken to [facility] since last visit, she had cxr on 4/30/24 which was forwarded to Dr. Pedro. Encouraged pt to reach out to make sure she gets follow up. She received call from surgeon in [facility] from referral placed in March, current plan is neoadjuvant chemotherapy, she is on cycle 2, after 3 cycles will repeat scans.  Labs reviewed, pt is cleared for chemotherapy.    ==05/30/2024: ANC 2446, wbc 4.21, hgb 10.6, plt 225. Scans due after completion of cycle 3 (end of June). Pt presents for C3D1 of Gemcitabine/abraxane, tolerating well. Reports Dr. Pedro at [facility] has not received her KUB to eval stents, will have resent.  Appetite improved without megace, constipation improved, pain improved, will continue chemotherapy  ==06/13/2024: labs reviewed, anc 2494, wbc 4.67, plt 267, h/h 10.0/31.3.  Patient presents for Cycle 3 Day 15 of Gemcitabine/Abraxane with plans for repeat scans after 3 cycles.  Will order and have pt rtc with MD next appt for results as well as chemotherapy same day.  Pt was initially diagnosed at [facility] in [place] and staged as IB and was referred for diagnostic lap for staging, however, she then biliary obstruction with acute liver failure and had stents placed at [facility] (diagnostic lap was deferred, pt has not had surgical evaluation).  While at [facility] she  had scans which also showed a renal mass which may represent second primary, plans to work up further once pancreatic cancer is stable.  ==06/27/2024: Pt presents for Day 1 of Cycle 4 gemcitabine/abraxane.  She is scheduled for her PET scan on 07/03/2024. Ca 19-9 drawn 6/12/24 was 4724 u/ml.  Labs drawn 6/26/24: Wbc 4.39, h/h 10.3/33.1, plt 230k, anc 1936  == 7/11/24: review of PET scan with patient and family showing stable disease. Will plan to continue with current chemotherapy and plan to rescan with PET in 2 months. Genetic and genomic testing ordered today.  == 7/25/24: [facility] testing had insufficient material and could not be tested. Patient with TP53 mutation. No known clinical trials. Continuing with [place]-Abraxane. Restaging scan in 2 months    2. Pain Management:  == Continue with oxycodone prn  ==Refilled 4/18/24  ==Avoid acetaminophen     3. Renal Mass  ==Per CT at [facility]  ==Possible renal calculi per CT at [place] vs possible second primary, can work up once patient's pancreatic cancer is stable, current priority is to start chemotherapy as she was recently admitted for obstructive juandice secondary to her pancreatic cancer    4. Advanced Care Planning  ==Pt has power of , will bring in at next visit    5. Constipation  ==Continue current bowel regimen, stool softeners and prunes which is effective  ==opoid related    6. Rash  ==Resolved  ==Premedications added to chemotherapy: Dexamethasone, benadryl, pepcid    7. Genetic and Genomic testing ordered -tissue and blood     Plan:  Continue Gemcitabine/Abraxane q 2 weeks    RTC 2 weeks for NP visit with labs: CBC, CMP for follow up and treatment  Labs with home health as planned       Total time spent in counseling and discussion about further management options including relevant lab work, treatment,  prognosis, medications and intended side effects was more than 30 minutes. More than 50% of the time was spent on counseling and  coordination of care.  This includes face to face time and non-face to face time preparing to see the patient (eg, review of tests), Obtaining and/or reviewing separately obtained history, Documenting clinical information in the electronic or other health record, Independently interpreting resultsand communicating results to the patient/family/caregiver, or Care coordination.

## 2024-08-08 ENCOUNTER — OFFICE VISIT (OUTPATIENT)
Dept: HEMATOLOGY/ONCOLOGY | Facility: CLINIC | Age: 74
End: 2024-08-08
Payer: MEDICARE

## 2024-08-08 ENCOUNTER — TELEPHONE (OUTPATIENT)
Dept: HEMATOLOGY/ONCOLOGY | Facility: CLINIC | Age: 74
End: 2024-08-08

## 2024-08-08 VITALS
WEIGHT: 96.38 LBS | DIASTOLIC BLOOD PRESSURE: 82 MMHG | HEIGHT: 62 IN | OXYGEN SATURATION: 97 % | TEMPERATURE: 98 F | BODY MASS INDEX: 17.74 KG/M2 | HEART RATE: 116 BPM | RESPIRATION RATE: 16 BRPM | SYSTOLIC BLOOD PRESSURE: 131 MMHG

## 2024-08-08 DIAGNOSIS — G89.3 CANCER RELATED PAIN: ICD-10-CM

## 2024-08-08 DIAGNOSIS — C78.7 METASTASIS TO LIVER: ICD-10-CM

## 2024-08-08 DIAGNOSIS — C25.0 MALIGNANT NEOPLASM OF HEAD OF PANCREAS: ICD-10-CM

## 2024-08-08 DIAGNOSIS — T40.2X5A CONSTIPATION DUE TO OPIOID THERAPY: ICD-10-CM

## 2024-08-08 DIAGNOSIS — C25.9 PANCREATIC ADENOCARCINOMA: Primary | ICD-10-CM

## 2024-08-08 DIAGNOSIS — K59.03 CONSTIPATION DUE TO OPIOID THERAPY: ICD-10-CM

## 2024-08-08 PROCEDURE — 3075F SYST BP GE 130 - 139MM HG: CPT | Mod: CPTII,,, | Performed by: NURSE PRACTITIONER

## 2024-08-08 PROCEDURE — 3079F DIAST BP 80-89 MM HG: CPT | Mod: CPTII,,, | Performed by: NURSE PRACTITIONER

## 2024-08-08 PROCEDURE — 1125F AMNT PAIN NOTED PAIN PRSNT: CPT | Mod: CPTII,,, | Performed by: NURSE PRACTITIONER

## 2024-08-08 PROCEDURE — 99215 OFFICE O/P EST HI 40 MIN: CPT | Mod: ,,, | Performed by: NURSE PRACTITIONER

## 2024-08-08 PROCEDURE — 1159F MED LIST DOCD IN RCRD: CPT | Mod: CPTII,,, | Performed by: NURSE PRACTITIONER

## 2024-08-08 PROCEDURE — G2211 COMPLEX E/M VISIT ADD ON: HCPCS | Mod: ,,, | Performed by: NURSE PRACTITIONER

## 2024-08-08 PROCEDURE — 3008F BODY MASS INDEX DOCD: CPT | Mod: CPTII,,, | Performed by: NURSE PRACTITIONER

## 2024-08-09 ENCOUNTER — TELEPHONE (OUTPATIENT)
Dept: HEMATOLOGY/ONCOLOGY | Facility: CLINIC | Age: 74
End: 2024-08-09
Payer: MEDICARE

## 2024-08-12 DIAGNOSIS — C25.9 PANCREATIC ADENOCARCINOMA: Primary | ICD-10-CM

## 2024-08-12 DIAGNOSIS — C25.0 MALIGNANT NEOPLASM OF HEAD OF PANCREAS: ICD-10-CM

## 2024-08-15 ENCOUNTER — TELEPHONE (OUTPATIENT)
Dept: HEMATOLOGY/ONCOLOGY | Facility: CLINIC | Age: 74
End: 2024-08-15
Payer: MEDICARE

## 2024-08-15 NOTE — TELEPHONE ENCOUNTER
----- Message from Bibi Tan sent at 8/15/2024  9:11 AM CDT -----  Contact: pt  Pt saritha Overton  returning a call to person she spoke with and she can be reached at [phone] and she does need a call back.  She would like list of her medication sent to hospital next door.   does not need a call back      Thanks,

## 2024-08-15 NOTE — TELEPHONE ENCOUNTER
----- Message from Micaela Garduno sent at 8/15/2024  8:39 AM CDT -----  Contact: Brooklynn Mondragon)  Brooklynn called in regards to she states that Patient is being Hospitalized next door and the Hospital is needing a list of all her medications. Call Back is [phone]

## 2024-08-20 ENCOUNTER — TELEPHONE (OUTPATIENT)
Dept: HEMATOLOGY/ONCOLOGY | Facility: CLINIC | Age: 74
End: 2024-08-20
Payer: MEDICARE

## 2024-08-20 DIAGNOSIS — G89.3 CANCER RELATED PAIN: ICD-10-CM

## 2024-08-20 RX ORDER — OXYCODONE HYDROCHLORIDE 10 MG/1
10 TABLET ORAL EVERY 6 HOURS PRN
Qty: 60 TABLET | Refills: 0 | Status: SHIPPED | OUTPATIENT
Start: 2024-08-20

## 2024-08-22 ENCOUNTER — OFFICE VISIT (OUTPATIENT)
Dept: HEMATOLOGY/ONCOLOGY | Facility: CLINIC | Age: 74
End: 2024-08-22
Payer: MEDICARE

## 2024-08-22 VITALS
HEIGHT: 62 IN | WEIGHT: 94 LBS | HEART RATE: 59 BPM | TEMPERATURE: 98 F | OXYGEN SATURATION: 98 % | BODY MASS INDEX: 17.3 KG/M2 | SYSTOLIC BLOOD PRESSURE: 138 MMHG | DIASTOLIC BLOOD PRESSURE: 82 MMHG | RESPIRATION RATE: 16 BRPM

## 2024-08-22 DIAGNOSIS — T40.2X5A CONSTIPATION DUE TO OPIOID THERAPY: Primary | ICD-10-CM

## 2024-08-22 DIAGNOSIS — C25.0 MALIGNANT NEOPLASM OF HEAD OF PANCREAS: ICD-10-CM

## 2024-08-22 DIAGNOSIS — G89.3 CANCER RELATED PAIN: ICD-10-CM

## 2024-08-22 DIAGNOSIS — C25.9 PANCREATIC ADENOCARCINOMA: ICD-10-CM

## 2024-08-22 DIAGNOSIS — K59.03 CONSTIPATION DUE TO OPIOID THERAPY: Primary | ICD-10-CM

## 2024-08-22 RX ORDER — FAMOTIDINE 10 MG/ML
20 INJECTION INTRAVENOUS
Start: 2024-09-09

## 2024-08-22 RX ORDER — SODIUM CHLORIDE 0.9 % (FLUSH) 0.9 %
10 SYRINGE (ML) INJECTION
OUTPATIENT
Start: 2024-09-09

## 2024-08-22 RX ORDER — HEPARIN 100 UNIT/ML
500 SYRINGE INTRAVENOUS
OUTPATIENT
Start: 2024-09-09

## 2024-08-22 RX ORDER — ONDANSETRON HYDROCHLORIDE 2 MG/ML
8 INJECTION, SOLUTION INTRAVENOUS
OUTPATIENT
Start: 2024-09-09

## 2024-08-22 RX ORDER — DOCUSATE SODIUM 100 MG/1
100 CAPSULE, LIQUID FILLED ORAL 2 TIMES DAILY
Qty: 60 CAPSULE | Refills: 11 | Status: SHIPPED | OUTPATIENT
Start: 2024-08-22 | End: 2025-08-22

## 2024-08-22 RX ORDER — SODIUM CHLORIDE 0.9 % (FLUSH) 0.9 %
10 SYRINGE (ML) INJECTION
OUTPATIENT
Start: 2024-08-26

## 2024-08-22 RX ORDER — FAMOTIDINE 10 MG/ML
20 INJECTION INTRAVENOUS
Start: 2024-08-26

## 2024-08-22 RX ORDER — DIPHENHYDRAMINE HCL 25 MG
25 CAPSULE ORAL
Start: 2024-09-09

## 2024-08-22 RX ORDER — DIPHENHYDRAMINE HCL 25 MG
25 CAPSULE ORAL
Start: 2024-08-26

## 2024-08-22 RX ORDER — METOCLOPRAMIDE 10 MG/1
10 TABLET ORAL
Qty: 90 TABLET | Refills: 1 | Status: SHIPPED | OUTPATIENT
Start: 2024-08-22 | End: 2025-08-22

## 2024-08-22 RX ORDER — HEPARIN 100 UNIT/ML
500 SYRINGE INTRAVENOUS
OUTPATIENT
Start: 2024-08-26

## 2024-08-22 RX ORDER — ONDANSETRON HYDROCHLORIDE 2 MG/ML
8 INJECTION, SOLUTION INTRAVENOUS
OUTPATIENT
Start: 2024-08-26

## 2024-08-22 NOTE — PROGRESS NOTES
MEDICAL ONCOLOGY FOLLOW UP CONSULTATION NOTE    Patient ID: Luanne Hernández is a 73 y.o. female.    Chief Complaint: Pancreatic adenocarcinoma    HPI: Patient is a 73 year female with no reported PMH who initially presented to [facility] with epigastric abdominal pain ultimately found to have pancreatic mass. MRI  performed and no metastatic lesions found, GI performed EUS. Has been evaluated by Hepato-biliary surgery at  and is consider borderline resectable with plan for dusty-adjuvant chemotherapy. She presents here to establish care close to her house.     Pathology: 3/1/24    Pancreatic head mass, fine needle aspiration (4 smears, 1 ThinPrep, 1   cell block):                                                            - Positive for malignant cells, consistent with ductal adenocarcinoma.       Imaging:     CT C/A/P : 24    Shows an ill-defined low-density mass in the region of the head and uncinate process of the pancreas suspicious for adenocarcinoma. CA 19-9 is over 42,000. There is also diffuse dilation of the pancreatic duct along with pancreatic pseudocyst formation   No evidence of metastatic disease in the chest.     PET 7/3/2024:                 Past Medical History:   Diagnosis Date    Pancreatic cancer      Family History   Problem Relation Name Age of Onset    Heart failure Mother      Hodgkin's lymphoma Father      Bladder Cancer Brother       Social History     Socioeconomic History    Marital status: Single   Tobacco Use    Smoking status: Former     Current packs/day: 0.00     Types: Cigarettes     Quit date:      Years since quittin.6    Smokeless tobacco: Former   Substance and Sexual Activity    Alcohol use: Not Currently    Drug use: Never     Social Determinants of Health     Financial Resource Strain: Low Risk  (2024)    Overall Financial Resource Strain (CARDIA)     Difficulty of Paying Living Expenses: Not hard at all   Food Insecurity: No Food Insecurity (2024)     Hunger Vital Sign     Worried About Running Out of Food in the Last Year: Never true     Ran Out of Food in the Last Year: Never true   Transportation Needs: No Transportation Needs (4/30/2024)    PRAPARE - Transportation     Lack of Transportation (Medical): No     Lack of Transportation (Non-Medical): No   Physical Activity: Insufficiently Active (4/30/2024)    Exercise Vital Sign     Days of Exercise per Week: 2 days     Minutes of Exercise per Session: 10 min   Stress: No Stress Concern Present (4/30/2024)    [employer] - Occupational Stress Questionnaire     Feeling of Stress : Not at all   Housing Stability: Unknown (2/25/2024)    Received from [facility], [facility]    Housing Stability Vital Sign     Unable to Pay for Housing in the Last Year: No     Number of Places Lived in the Last Year: 1     Past Surgical History:   Procedure Laterality Date    LASIK Bilateral          Review of systems:  Review of Systems   Constitutional:  Positive for activity change. Negative for appetite change, chills, diaphoresis, fatigue and unexpected weight change.   HENT:  Negative for congestion, facial swelling, hearing loss, mouth sores, trouble swallowing and voice change.    Eyes:  Negative for photophobia, pain, discharge and itching.   Respiratory:  Negative for apnea, cough, choking, chest tightness and shortness of breath.    Cardiovascular:  Negative for chest pain, palpitations and leg swelling.   Gastrointestinal:  Positive for abdominal pain. Negative for abdominal distention, anal bleeding and blood in stool.   Endocrine: Negative for cold intolerance, heat intolerance, polydipsia and polyphagia.   Genitourinary:  Negative for difficulty urinating, dysuria, flank pain and hematuria.   Musculoskeletal:  Negative for arthralgias, back  pain, joint swelling, myalgias, neck pain and neck stiffness.   Skin:  Positive for rash. Negative for color change, pallor and wound.   Allergic/Immunologic: Negative for environmental allergies, food allergies and immunocompromised state.   Neurological:  Negative for dizziness, seizures, facial asymmetry, speech difficulty, light-headedness, numbness and headaches.   Hematological:  Negative for adenopathy. Does not bruise/bleed easily.   Psychiatric/Behavioral:  Negative for agitation, behavioral problems, confusion, decreased concentration and sleep disturbance.            Physical Exam  Vitals and nursing note reviewed.   Constitutional:       General: She is not in acute distress.     Appearance: Normal appearance. She is not ill-appearing.   HENT:      Head: Normocephalic and atraumatic.      Nose: No congestion or rhinorrhea.   Eyes:      General: No scleral icterus.     Extraocular Movements: Extraocular movements intact.      Pupils: Pupils are equal, round, and reactive to light.   Cardiovascular:      Rate and Rhythm: Normal rate and regular rhythm.      Pulses: Normal pulses.      Heart sounds: Normal heart sounds. No murmur heard.     No gallop.   Pulmonary:      Effort: Pulmonary effort is normal. No respiratory distress.      Breath sounds: Normal breath sounds. No stridor. No wheezing or rhonchi.   Abdominal:      General: Bowel sounds are normal. There is no distension.      Palpations: There is no mass.      Tenderness: There is abdominal tenderness. There is no guarding.   Musculoskeletal:         General: No swelling, tenderness, deformity or signs of injury. Normal range of motion.      Cervical back: Normal range of motion and neck supple. No rigidity. No muscular tenderness.      Right lower leg: No edema.      Left lower leg: No edema.   Skin:     General: Skin is warm.      Coloration: Skin is not jaundiced or pale.      Findings: No bruising, lesion or rash.   Neurological:      General:  No focal deficit present.      Mental Status: She is alert and oriented to person, place, and time.      Cranial Nerves: No cranial nerve deficit.      Sensory: No sensory deficit.      Motor: No weakness.      Gait: Gait normal.   Psychiatric:         Mood and Affect: Mood normal.         Behavior: Behavior normal.         Thought Content: Thought content normal.     There were no vitals filed for this visit.        There is no height or weight on file to calculate BSA.    Assessment/Plan:      ECOG 1    Adenocarcinoma arising from pancreatic head and uncinate process:    == Borderline resectable.  No evidence of arterial involvement on CT scan.  No evidence of distant metastatic disease on CT imaging.   Has been evaluated by hepatobiliary surgery in [place] with plan for neoadjuvant chemotherapy.   == Discussed with her and family management guidelines and my recommendation for staging laparoscopy. Baseline  at 42 K at outside hospital  == I discussed with her the intended side effects of treatment giving her opportunity to ask questions.  After detailed discussion of intended side effects we agreed to start gemcitabine Abraxane secondary to her age and performance status.  Prior authorization request for gemcitabine plus Abraxane plus minus subsequent chemoradiation sent.    ==03/25/2024: Patient to clinic for chemotherapy education on Gemcitabine/Abraxane. She has some right flank pain and jaundice. Family with her reports juandice started yesterday.  Labs drawn 3.22/24 reviewed and bili 7.5, labs drawn 3 weeks prior bili was 0.4. Chemotherapy education not provided, pt needs evaluation may need stent placement.  Local ER not able to do MRCP/bili stents, called Dr. Gilmore's office in [place] and spoke to his nurse who reported he will not be back until 3/28/2024 with no coverage until then, called [place] where pt was initially diagnosed, no answer, left messages.  discussed with patient and   Yair, will send referral to [facility] and patient will go to ER.  Pt will still need port placement for chemotherapy, lap for staging, at this time most acute matter is liver function.  ==04/01/2024:  Pt here today to discuss upcoming chemotherapy, side effect profile, risk versus benefits, and expected outcomes have been discussed today. All questions and concerns answered and consents have been signed. Pt taking oxycodone 10mg po q 4 hours per [facility].  Plan to start Gemcitabine/Abraxane as soon as port placed, pt has appt with Dr. Mcqueen today for eval for port placement.  Pt was seen at [facility] on 3/25/2024 and biliary stents placed, she has appt on 4/16/24 at [facility] for removal of plastic stents, metal to remain in place. Discussed with Dr. Mazariegos, plan to start chemo asap once port placed, will defer diagnostic lap initially recommended for staging  ==04/11/2024: Patient started chemotherapy on 4/4/2024, developed pruritic rash to abdomen starting day after chemotherapy which benadryl and calamine lotion has helped.  Likely secondary to Abraxane, will admin dexamethasone today and premedicate with dexamethasone po home medication with each treatment, and add pepcid and benadryl to premedications for subsequent chemotherapy.  No dyspnea, chest tightness, angioedema or other symptoms. Other treatment options would include FOLFIRINOX which previously has been discussed with Dr. Mazariegos and patient not likely to tolerate well. Will premedicate patient and continue current treatment.  She also had constipation which has now been relieved with increasing stool softener to 2 daily and adding prunes to diet.  ==04/18/2024: KUB reviewed and forwarded to Dr. Pedro at [facility]. Metal biliary stents in place, plastic stents not seen, may have passed. Patient to follow up with Dr. Pedro to confirm.  Premedication added to chemotherapy today with benadryl, dexamethasone and pepcid as well as home medication benadryl, pepcid and  dexamethasone starting day prior to treatment for 3 days.  Oxycodone refilled, labs reviewed, pt cleared for chemotherapy  ==05/02/2024: Tolerating chemotherapy well, patient did well with addition of premedications last cycle, did not develop rash with last cycle. KUB completed this week for eval of stents, forwarded to Dr. Pedro at [facility] to eval if she will have removed.  Labs reviewed, plan to continue chemotherapy, will obtain scans after 2 complete cycles of chemotherapy, needs to be ordered next appt  ==05/16/2024: Patient tolerating [place]/abraxane well. Reports improvement in appetite and energy.  Has not spoken to [facility] since last visit, she had cxr on 4/30/24 which was forwarded to Dr. Pedro. Encouraged pt to reach out to make sure she gets follow up. She received call from surgeon in [facility] from referral placed in March, current plan is neoadjuvant chemotherapy, she is on cycle 2, after 3 cycles will repeat scans.  Labs reviewed, pt is cleared for chemotherapy.    ==05/30/2024: ANC 2446, wbc 4.21, hgb 10.6, plt 225. Scans due after completion of cycle 3 (end of June). Pt presents for C3D1 of Gemcitabine/abraxane, tolerating well. Reports Dr. Pedro at [facility] has not received her KUB to eval stents, will have resent.  Appetite improved without megace, constipation improved, pain improved, will continue chemotherapy  ==06/13/2024: labs reviewed, anc 2494, wbc 4.67, plt 267, h/h 10.0/31.3.  Patient presents for Cycle 3 Day 15 of Gemcitabine/Abraxane with plans for repeat scans after 3 cycles.  Will order and have pt rtc with MD next appt for results as well as chemotherapy same day.  Pt was initially diagnosed at [facility] in [place] and staged as IB and was referred for diagnostic lap for staging, however, she then biliary obstruction with acute liver failure and had stents placed at [facility] (diagnostic lap was deferred, pt has not had surgical evaluation).  While at [facility] she had scans which also showed a renal  mass which may represent second primary, plans to work up further once pancreatic cancer is stable.  ==06/27/2024: Pt presents for Day 1 of Cycle 4 gemcitabine/abraxane.  She is scheduled for her PET scan on 07/03/2024. Ca 19-9 drawn 6/12/24 was 4724 u/ml.  Labs drawn 6/26/24: Wbc 4.39, h/h 10.3/33.1, plt 230k, anc 1936  == 7/11/24: review of PET scan with patient and family showing stable disease. Will plan to continue with current chemotherapy and plan to rescan with PET in 2 months. Genetic and genomic testing ordered today.  == 7/25/24: [facility] testing had insufficient material and could not be tested. Patient with TP53 mutation. No known clinical trials. Continuing with [place]-Abraxane. Restaging scan in 2 months  08/08/2024: Ca 19-9 11,672 reviewed last CT PET as well as ct and MRI from [facility] at diagnosis. Pt has liver mass that was not noted on MRI abdomen at [facility] at diagnosis, did have area that was indeterminate on ct. May represent new metastatic disease, discussed with Dr. Mazariegos and biopsy of liver mass offered to pt, pt would prefer watch and observe until after next scan due in September.  Will continue [place]/Abraxane, labs reviewed, pt cleared for chemotherapy  ==08/22/2024: Pt recently in ER for fecal impaction.  She is on oxycodone 10mg po 3-4x daily. Also on Mirilax daily senna prn. Not taking senna. She was started on reglan in ER and given mag citrate at er and a bottle as home medication.  She has not had bm in 7 days, did not take mag citrate, only taking mirilax.  Will send out rx for reglan and colace as routine medications, she may take mag citrate today and if no bm is to return to er. Instructed on bowel regimen and in future if no bm x 5 days to take mag citrate and if no bm within 24 hours of mag citrate to er.  CT abd/pelvis with contrast in ER did not demonstrate any liver masses, prev was noted on PET.  Labs reviewed, hgb 9.9, plt 307, anc 4414.  Will admin chemo on Monday 9/26/24    2.  Pain Management:  == Continue with oxycodone prn  ==Refilled 4/18/24  ==Avoid acetaminophen     3. Renal Mass  ==Per CT at [facility] [place]  ==Possible renal calculi per CT at [place] vs possible second primary, can work up once patient's pancreatic cancer is stable, current priority is to start chemotherapy as she was recently admitted for obstructive juandice secondary to her pancreatic cancer    4. Advanced Care Planning  ==Pt has power of , will bring in at next visit    5. Constipation  ==Continue current bowel regimen, stool softeners and prunes which is effective  ==opoid related    6. Rash  ==Resolved  ==Premedications added to chemotherapy: Dexamethasone, benadryl, pepcid    7. Genetic and Genomic testing ordered -tissue and blood     Plan:  Continue Gemcitabine/Abraxane q 2 weeks - will receive on Monday 9/26  Mag citrate today, start colace, start reglan if no bm in 24 hours to ER  RTC 2 weeks for NP visit with labs: CBC, CMP for follow up and treatment - return on 9/9/24  Labs with home health as planned       Total time spent in counseling and discussion about further management options including relevant lab work, treatment,  prognosis, medications and intended side effects was more than 40 minutes. More than 50% of the time was spent on counseling and coordination of care.  This includes face to face time and non-face to face time preparing to see the patient (eg, review of tests), Obtaining and/or reviewing separately obtained history, Documenting clinical information in the electronic or other health record, Independently interpreting resultsand communicating results to the patient/family/caregiver, or Care coordination.

## 2024-08-23 ENCOUNTER — TELEPHONE (OUTPATIENT)
Dept: HEMATOLOGY/ONCOLOGY | Facility: CLINIC | Age: 74
End: 2024-08-23
Payer: MEDICARE

## 2024-08-23 NOTE — TELEPHONE ENCOUNTER
Pt stated she has not had a BM in 24 hours and will be going to the ER at [facility] in a couple of hours as per Karen Garrison request.

## 2024-08-23 NOTE — TELEPHONE ENCOUNTER
----- Message from Lynn Blake sent at 8/23/2024  8:11 AM CDT -----  Contact: self  Type:  Patient Returning Call    Who Called:Luanne Hernández  Who Left Message for Patient:unsure  Does the patient know what this is regarding?:bowel movement?  Within 24- 48 hrs  Would the patient rather a call back or a response via [facility]?   Best Call Back Number:[phone]  Additional Information: n/a

## 2024-08-26 ENCOUNTER — TELEPHONE (OUTPATIENT)
Dept: HEMATOLOGY/ONCOLOGY | Facility: CLINIC | Age: 74
End: 2024-08-26
Payer: MEDICARE

## 2024-08-26 NOTE — TELEPHONE ENCOUNTER
Pt is not coming for tx today. Does not feel well at this time and her power of  Brooklynn requested info her diagnoses to be mailed to her house.

## 2024-08-26 NOTE — TELEPHONE ENCOUNTER
----- Message from Cristina Varner sent at 8/26/2024  8:55 AM CDT -----  Contact: Brooklynn/Relative  Patient's relative is calling to speak with someone regarding care. Additional information was not provided. Please give Ms. Overton a call back at [phone] to assist.  Thank you,  GH

## 2024-08-29 ENCOUNTER — TELEPHONE (OUTPATIENT)
Dept: HEMATOLOGY/ONCOLOGY | Facility: CLINIC | Age: 74
End: 2024-08-29
Payer: MEDICARE

## 2024-08-29 DIAGNOSIS — G89.3 CANCER RELATED PAIN: Primary | ICD-10-CM

## 2024-08-29 RX ORDER — MORPHINE SULFATE 15 MG/1
15 TABLET, FILM COATED, EXTENDED RELEASE ORAL EVERY 8 HOURS PRN
Qty: 60 TABLET | Refills: 0 | Status: SHIPPED | OUTPATIENT
Start: 2024-08-29 | End: 2025-08-29

## 2024-08-29 NOTE — TELEPHONE ENCOUNTER
----- Message from Chastity Dill sent at 8/29/2024  8:08 AM CDT -----  Contact: kaylyn - friend  Patient is requesting a call back regarding in pain. Please call back at [phone]

## 2024-08-30 ENCOUNTER — TELEPHONE (OUTPATIENT)
Dept: HEMATOLOGY/ONCOLOGY | Facility: CLINIC | Age: 74
End: 2024-08-30
Payer: MEDICARE

## 2024-08-30 NOTE — TELEPHONE ENCOUNTER
----- Message from Karen Collier NP sent at 8/29/2024  1:10 PM CDT -----  Contact: kaylyn - friend  She will need to go to ER if she hasn't had a BM in 2 weeks  ----- Message -----  From: Marivel Pena MA  Sent: 8/29/2024  10:41 AM CDT  To: Karen Collier NP    Spoke with the patient's friend and she stated the patient has not had a BM for the last 2 weeks. She has been trying the linzess and miralax and no success. Pt is not eating at all and struggling to swallow. Pt is very weak. Please advice.  ----- Message -----  From: Karen Collier NP  Sent: 8/29/2024  10:08 AM CDT  To: Marivel Pena MA    I sent out extended release morphine.  I started with the lowest dose possible. Please let her know this is a long acting medication to take twice a day.  It is not an as needed medication.  It will be her baseline long acting then she can use her oxycodone prn for breakthough pain.  Because it is time released It will take some time for it to build up in her system so she shouldn't expect it to kick in immediately so if she doesn't take it twice daily as prescribed it will not work    She must keep her bowels moving.  ----- Message -----  From: Marivel Pena MA  Sent: 8/29/2024   9:40 AM CDT  To: Karen Collier NP    Spoke with the patients friend who stated she has been helping take care of Luanne. Pt was hospitalized day before yesterday for pain and was told by the doctors to double up on the oxycodone and prescribed dilaudid. She states when she took dilaudid last night she slept well and took 2 this morning at 8:30 am but seems like it's not working. She is requesting something stronger and wants to prevent going to the ER again. Please advice.  ----- Message -----  From: Chastity Dill  Sent: 8/29/2024   8:25 AM CDT  To: Nando Jones Staff    Type:  Patient Returning Call    Who Called:Luanne Hernández  Who Left Message for Patient:Marivel  Does the patient know what this is  regarding?:pain  Would the patient rather a call back or a response via [facility]? Call back  Best Call Back Number:[phone]  or [phone]  Additional Information: n/a

## 2024-08-30 NOTE — TELEPHONE ENCOUNTER
Pt's friend stated the patient's family placed her on hospice yesterday. Will contact hospice nurse regarding BM

## 2024-09-09 ENCOUNTER — TELEPHONE (OUTPATIENT)
Dept: HEMATOLOGY/ONCOLOGY | Facility: CLINIC | Age: 74
End: 2024-09-09

## 2024-09-09 NOTE — TELEPHONE ENCOUNTER
----- Message from Chastity Dill sent at 9/9/2024  3:51 PM CDT -----  Contact: dl lizama - saritha calling to let office know pt passed away today 09/09/2024